# Patient Record
Sex: FEMALE | Race: WHITE | NOT HISPANIC OR LATINO | Employment: FULL TIME | URBAN - METROPOLITAN AREA
[De-identification: names, ages, dates, MRNs, and addresses within clinical notes are randomized per-mention and may not be internally consistent; named-entity substitution may affect disease eponyms.]

---

## 2016-09-07 LAB — PAP SMEAR - HIM PATIENT REPORTED: NORMAL

## 2019-05-22 ENCOUNTER — OFFICE VISIT - HEALTHEAST (OUTPATIENT)
Dept: FAMILY MEDICINE | Facility: CLINIC | Age: 24
End: 2019-05-22

## 2019-05-22 ENCOUNTER — COMMUNICATION - HEALTHEAST (OUTPATIENT)
Dept: TELEHEALTH | Facility: CLINIC | Age: 24
End: 2019-05-22

## 2019-05-22 DIAGNOSIS — Z00.00 ROUTINE GENERAL MEDICAL EXAMINATION AT A HEALTH CARE FACILITY: ICD-10-CM

## 2019-05-22 DIAGNOSIS — Z97.5 IUD (INTRAUTERINE DEVICE) IN PLACE: ICD-10-CM

## 2019-05-22 DIAGNOSIS — Z12.4 SCREENING FOR CERVICAL CANCER: ICD-10-CM

## 2019-05-22 LAB
CHOLEST SERPL-MCNC: 174 MG/DL
FASTING STATUS PATIENT QL REPORTED: YES
FASTING STATUS PATIENT QL REPORTED: YES
GLUCOSE BLD-MCNC: 93 MG/DL (ref 70–99)
HDLC SERPL-MCNC: 61 MG/DL
HGB BLD-MCNC: 14.9 G/DL (ref 12–16)
LDLC SERPL CALC-MCNC: 100 MG/DL
TRIGL SERPL-MCNC: 65 MG/DL

## 2019-05-22 ASSESSMENT — MIFFLIN-ST. JEOR: SCORE: 1271.12

## 2019-05-23 LAB
HPV SOURCE: NORMAL
HUMAN PAPILLOMA VIRUS 16 DNA: NEGATIVE
HUMAN PAPILLOMA VIRUS 18 DNA: NEGATIVE
HUMAN PAPILLOMA VIRUS FINAL DIAGNOSIS: NORMAL
HUMAN PAPILLOMA VIRUS OTHER HR: NEGATIVE
SPECIMEN DESCRIPTION: NORMAL

## 2019-05-24 ENCOUNTER — APPOINTMENT (OUTPATIENT)
Age: 24
Setting detail: DERMATOLOGY
End: 2019-05-27

## 2019-05-24 VITALS — RESPIRATION RATE: 16 BRPM | HEIGHT: 64 IN | WEIGHT: 118 LBS

## 2019-05-24 DIAGNOSIS — Q826 OTHER SPECIFIED ANOMALIES OF SKIN: ICD-10-CM

## 2019-05-24 DIAGNOSIS — Q828 OTHER SPECIFIED ANOMALIES OF SKIN: ICD-10-CM

## 2019-05-24 DIAGNOSIS — Q819 OTHER SPECIFIED ANOMALIES OF SKIN: ICD-10-CM

## 2019-05-24 DIAGNOSIS — D22 MELANOCYTIC NEVI: ICD-10-CM

## 2019-05-24 PROBLEM — D22.61 MELANOCYTIC NEVI OF RIGHT UPPER LIMB, INCLUDING SHOULDER: Status: ACTIVE | Noted: 2019-05-24

## 2019-05-24 PROBLEM — D22.62 MELANOCYTIC NEVI OF LEFT UPPER LIMB, INCLUDING SHOULDER: Status: ACTIVE | Noted: 2019-05-24

## 2019-05-24 PROBLEM — Q82.8 OTHER SPECIFIED CONGENITAL MALFORMATIONS OF SKIN: Status: ACTIVE | Noted: 2019-05-24

## 2019-05-24 PROBLEM — D22.5 MELANOCYTIC NEVI OF TRUNK: Status: ACTIVE | Noted: 2019-05-24

## 2019-05-24 PROBLEM — D22.71 MELANOCYTIC NEVI OF RIGHT LOWER LIMB, INCLUDING HIP: Status: ACTIVE | Noted: 2019-05-24

## 2019-05-24 PROBLEM — D22.72 MELANOCYTIC NEVI OF LEFT LOWER LIMB, INCLUDING HIP: Status: ACTIVE | Noted: 2019-05-24

## 2019-05-24 PROCEDURE — OTHER COUNSELING: OTHER

## 2019-05-24 PROCEDURE — 99203 OFFICE O/P NEW LOW 30 MIN: CPT

## 2019-05-24 PROCEDURE — OTHER PRESCRIPTION: OTHER

## 2019-05-24 RX ORDER — CLOBETASOL PROPIONATE 0.5 MG/G
A THIN LAYER CREAM TOPICAL BID
Qty: 1 | Refills: 0 | Status: ERX | COMMUNITY
Start: 2019-05-24

## 2019-05-24 ASSESSMENT — LOCATION DETAILED DESCRIPTION DERM
LOCATION DETAILED: RIGHT POPLITEAL SKIN
LOCATION DETAILED: RIGHT DISTAL POSTERIOR UPPER ARM
LOCATION DETAILED: LEFT PROXIMAL DORSAL FOREARM
LOCATION DETAILED: RIGHT ANTERIOR PROXIMAL UPPER ARM
LOCATION DETAILED: LEFT MEDIAL PLANTAR HEEL
LOCATION DETAILED: LEFT ANTERIOR PROXIMAL UPPER ARM
LOCATION DETAILED: RIGHT ANTERIOR PROXIMAL THIGH
LOCATION DETAILED: RIGHT MID-UPPER BACK
LOCATION DETAILED: LEFT ANTERIOR DISTAL THIGH
LOCATION DETAILED: LEFT PROXIMAL POSTERIOR UPPER ARM
LOCATION DETAILED: LEFT DISTAL POSTERIOR THIGH
LOCATION DETAILED: LEFT RIB CAGE

## 2019-05-24 ASSESSMENT — LOCATION SIMPLE DESCRIPTION DERM
LOCATION SIMPLE: LEFT FOREARM
LOCATION SIMPLE: RIGHT POSTERIOR UPPER ARM
LOCATION SIMPLE: RIGHT POPLITEAL SKIN
LOCATION SIMPLE: RIGHT THIGH
LOCATION SIMPLE: LEFT UPPER ARM
LOCATION SIMPLE: LEFT PLANTAR SURFACE
LOCATION SIMPLE: LEFT POSTERIOR UPPER ARM
LOCATION SIMPLE: RIGHT UPPER BACK
LOCATION SIMPLE: RIGHT UPPER ARM
LOCATION SIMPLE: LEFT THIGH
LOCATION SIMPLE: LEFT POSTERIOR THIGH
LOCATION SIMPLE: ABDOMEN

## 2019-05-24 ASSESSMENT — LOCATION ZONE DERM
LOCATION ZONE: LEG
LOCATION ZONE: TRUNK
LOCATION ZONE: ARM
LOCATION ZONE: FEET

## 2019-05-29 ENCOUNTER — RECORDS - HEALTHEAST (OUTPATIENT)
Dept: HEALTH INFORMATION MANAGEMENT | Facility: CLINIC | Age: 24
End: 2019-05-29

## 2019-05-30 LAB
BKR LAB AP ABNORMAL BLEEDING: NO
BKR LAB AP BIRTH CONTROL/HORMONES: NORMAL
BKR LAB AP CERVICAL APPEARANCE: NORMAL
BKR LAB AP GYN ADEQUACY: NORMAL
BKR LAB AP GYN INTERPRETATION: NORMAL
BKR LAB AP HPV REFLEX: NORMAL
BKR LAB AP LMP: NORMAL
BKR LAB AP PATIENT STATUS: NORMAL
BKR LAB AP PREVIOUS ABNORMAL: NORMAL
BKR LAB AP PREVIOUS NORMAL: 2016
HIGH RISK?: NO
PATH REPORT.COMMENTS IMP SPEC: NORMAL
RESULT FLAG (HE HISTORICAL CONVERSION): NORMAL

## 2019-06-09 ENCOUNTER — COMMUNICATION - HEALTHEAST (OUTPATIENT)
Dept: FAMILY MEDICINE | Facility: CLINIC | Age: 24
End: 2019-06-09

## 2019-08-20 ENCOUNTER — APPOINTMENT (OUTPATIENT)
Age: 24
Setting detail: DERMATOLOGY
End: 2019-08-20

## 2019-08-20 VITALS — RESPIRATION RATE: 16 BRPM | HEIGHT: 65 IN | WEIGHT: 117 LBS

## 2019-08-20 DIAGNOSIS — D22 MELANOCYTIC NEVI: ICD-10-CM

## 2019-08-20 PROBLEM — D48.5 NEOPLASM OF UNCERTAIN BEHAVIOR OF SKIN: Status: ACTIVE | Noted: 2019-08-20

## 2019-08-20 PROCEDURE — 11102 TANGNTL BX SKIN SINGLE LES: CPT

## 2019-08-20 PROCEDURE — 11103 TANGNTL BX SKIN EA SEP/ADDL: CPT

## 2019-08-20 PROCEDURE — OTHER COUNSELING: OTHER

## 2019-08-20 PROCEDURE — OTHER BIOPSY BY SHAVE METHOD: OTHER

## 2019-08-20 ASSESSMENT — LOCATION SIMPLE DESCRIPTION DERM
LOCATION SIMPLE: LEFT PLANTAR SURFACE
LOCATION SIMPLE: LEFT BREAST

## 2019-08-20 ASSESSMENT — LOCATION ZONE DERM
LOCATION ZONE: TRUNK
LOCATION ZONE: FEET

## 2019-08-20 ASSESSMENT — LOCATION DETAILED DESCRIPTION DERM
LOCATION DETAILED: LEFT MEDIAL PLANTAR HEEL
LOCATION DETAILED: LEFT MEDIAL BREAST 6-7:00 REGION

## 2019-08-20 NOTE — PROCEDURE: BIOPSY BY SHAVE METHOD
Additional Anesthesia Volume In Cc (Will Not Render If 0): 0
Silver Nitrate Text: The wound bed was treated with silver nitrate after the biopsy was performed.
Consent: Written consent was obtained and risks were reviewed including but not limited to scarring, infection, bleeding, scabbing, incomplete removal, nerve damage and allergy to anesthesia.
Destruction After The Procedure: No
Hemostasis: Drysol
Biopsy Type: H and E
Electrodesiccation Text: The wound bed was treated with electrodesiccation after the biopsy was performed.
Wound Care: Petrolatum
Curettage Text: The wound bed was treated with curettage after the biopsy was performed.
Detail Level: Detailed
Electrodesiccation And Curettage Text: The wound bed was treated with electrodesiccation and curettage after the biopsy was performed.
Billing Type: Client Bill
Notification Instructions: Patient will be notified of biopsy results. However, patient instructed to call the office if not contacted within 2 weeks.
Type Of Destruction Used: Curettage
Dressing: bandage
Anesthesia Type: 1% lidocaine with epinephrine
Was A Bandage Applied: Yes
Cryotherapy Text: The wound bed was treated with cryotherapy after the biopsy was performed.
Depth Of Biopsy: dermis
Biopsy Method: Dermablade
Anesthesia Volume In Cc (Will Not Render If 0): 0.5
Post-Care Instructions: I reviewed with the patient in detail post-care instructions. Patient is to keep the biopsy site dry overnight, and then apply bacitracin twice daily until healed. Patient may apply hydrogen peroxide soaks to remove any crusting.

## 2019-08-20 NOTE — HPI: EVALUATION OF SKIN LESION(S)
How Severe Are Your Spot(S)?: mild
Hpi Title: Evaluation of Skin Lesions
Additional History: Here to recheck the two moles

## 2019-08-20 NOTE — PROCEDURE: COUNSELING
Detail Level: Simple
Patient Specific Counseling (Will Not Stick From Patient To Patient): Pt decided to have both moles removed today.

## 2019-09-10 ENCOUNTER — COMMUNICATION - HEALTHEAST (OUTPATIENT)
Dept: FAMILY MEDICINE | Facility: CLINIC | Age: 24
End: 2019-09-10

## 2019-09-25 ENCOUNTER — APPOINTMENT (OUTPATIENT)
Age: 24
Setting detail: DERMATOLOGY
End: 2019-09-25

## 2019-09-25 VITALS — RESPIRATION RATE: 16 BRPM | HEIGHT: 67 IN | WEIGHT: 117 LBS

## 2019-09-25 DIAGNOSIS — D485 NEOPLASM OF UNCERTAIN BEHAVIOR OF SKIN: ICD-10-CM

## 2019-09-25 PROBLEM — D48.5 NEOPLASM OF UNCERTAIN BEHAVIOR OF SKIN: Status: ACTIVE | Noted: 2019-09-25

## 2019-09-25 PROCEDURE — OTHER COUNSELING: OTHER

## 2019-09-25 PROCEDURE — OTHER BIOPSY BY SHAVE METHOD: OTHER

## 2019-09-25 PROCEDURE — OTHER PATHOLOGY BILLING: OTHER

## 2019-09-25 PROCEDURE — 88305 TISSUE EXAM BY PATHOLOGIST: CPT

## 2019-09-25 PROCEDURE — 11102 TANGNTL BX SKIN SINGLE LES: CPT

## 2019-09-25 ASSESSMENT — LOCATION SIMPLE DESCRIPTION DERM: LOCATION SIMPLE: LEFT PLANTAR SURFACE

## 2019-09-25 ASSESSMENT — LOCATION ZONE DERM: LOCATION ZONE: FEET

## 2019-09-25 ASSESSMENT — LOCATION DETAILED DESCRIPTION DERM: LOCATION DETAILED: LEFT MEDIAL PLANTAR HEEL

## 2019-09-25 NOTE — PROCEDURE: COUNSELING
Patient Specific Counseling (Will Not Stick From Patient To Patient): Site p outlying biopsy and per pathology results, recommend deeper shave. Pt is here today for another shave biopsy and reviewed pathology results in great details.
Detail Level: Detailed

## 2019-09-25 NOTE — PROCEDURE: PATHOLOGY BILLING
Immunohistochemistry ([id number] and [id number]) billing is not performed here. Please use the Immunohistochemistry Stain Billing plan to accomplish this. Immunohistochemistry ([id number] and [id number]) billing is not performed here. Please use the Immunohistochemistry Stain Billing plan to accomplish this.

## 2019-09-25 NOTE — PROCEDURE: BIOPSY BY SHAVE METHOD
Post-Care Instructions: I reviewed with the patient in detail post-care instructions. Patient is to keep the biopsy site dry overnight, and then apply vaseline or Aquaphor with a new bandaid daily until healed.

## 2019-09-25 NOTE — HPI: SKIN LESION
Additional History: Further biopsy required because an inadequate sample was removed the first time.

## 2019-09-25 NOTE — PROCEDURE: BIOPSY BY SHAVE METHOD
Path Notes (To The Dermatopathologist): Previous bx site [id number] Path Notes (To The Dermatopathologist): Previous bx site [id number]

## 2021-05-29 NOTE — PROGRESS NOTES
"FEMALE PREVENTATIVE EXAM    Assessment and Plan:     1. Routine general medical examination at a health care facility  - Contraception: Mirena IUD, remove 9/2021  - Hemoglobin  - Glucose  - Lipid Cascade given her famhx and personal hx of mildly elevated cholesterol despite BMI 19 and active lifestyle with healthy eating patterns    2. Screening for cervical cancer  Reviewed her normal Pap result from 9/2016.  We will update this today as she is establishing care.  - Gynecologic Cytology (PAP Smear)     Next follow up:  Return in about 1 year (around 5/22/2020) for Annual physical.    Immunization Review  Adult Imm Review: No immunizations due today    I discussed the following with the patient:   Adult Healthy Living: Importance of regular exercise  Healthy nutrition  Getting adequate sleep  Stress management  Use of seat belts  Distracted driving  STI prevention  Contraception options  Supplement use      Subjective:   Chief Complaint: Meghann Burt is an 23 y.o. female here for a preventative health visit.     HPI:    Chief Complaint   Patient presents with     Annual Exam      new pt      Cholesterol levels run high in the maternal side of the family. Would like to be retested. Has been borderline in the past.     Last pap done 9/1/16 and normal. She shows me the formal report of this. Would like updated today to stay on schedule.     Mirena IUD- placed 9/1/16. Due to remove in 2021 and that's when she is thinking about trying to start having children. Occasional spotting but no periods with this. \"I love my IUD\".     Has a mole on her breast, changed recently. Has a dermatology appt scheduled for this already.    Wondering if left ear is draining. No recent colds.     Social History     Social History Narrative    . From [place], [place] originally. She is a  with [employer].  works with [employer]. Social alcohol 1x/every few weeks. Never smoker. Loves biking, golf, and aerobic dance videos, " "like Elsa. Exercises 3x/week.     Anya Palmer MD       Healthy Habits  Are you taking a daily aspirin? No  Do you typically exercising at least 40 min, 3-4 times per week?  NO  Do you usually eat at least 4 servings of fruit and vegetables a day, include whole grains and fiber and avoid regularly eating high fat foods? Yes  Have you had an eye exam in the past two years? Yes  Do you see a dentist twice per year? Yes  Do you have any concerns regarding sleep? No    Safety Screen  If you own firearms, are they secured in a locked gun cabinet or with trigger locks? NO  Do you feel you are safe where you are living?: Yes (5/22/2019  8:36 AM)  Do you feel you are safe in your relationship(s)?: Yes (5/22/2019  8:36 AM)      Review of Systems:  Please see above.  The rest of the review of systems are negative for all systems.     Pap History:   No - age 21-29 PAP every 3 years recommended  Cancer Screening       Status Date      PAP SMEAR Overdue 7/18/2014         Patient Care Team:  Anya Palmer MD as PCP - General (Family Medicine)        History     Reviewed By Date/Time Sections Reviewed    Anya Palmer MD 5/22/2019  9:22 AM Medical, Surgical, Tobacco, Family    [facility] 5/22/2019  9:16 AM Tobacco, Alcohol, Drug Use, Sexual Activity    Anya Palmer MD 5/22/2019  9:04 AM Social Documentation    [facility] 5/22/2019  8:36 AM Tobacco, Alcohol, Drug Use, Sexual Activity    [facility] 5/22/2019  8:35 AM Tobacco, Alcohol, Drug Use, Sexual Activity            Objective:   Vital Signs:   Visit Vitals  /76 (Patient Site: Left Arm, Patient Position: Sitting, Cuff Size: Adult Regular)   Pulse 76   Temp 97.8  F (36.6  C)   Ht 5' 5\" (1.651 m)   Wt 118 lb (53.5 kg)   LMP  (Approximate) Comment: Mirena Placed 09/01/2016   Breastfeeding? No   BMI 19.64 kg/m           PHYSICAL EXAM  Constitutional: Patient is oriented to person, place, and time. Patient appears " well-developed and well-nourished. No distress.   Head: Normocephalic and atraumatic.   Ears: External ear and TMs normal bilaterally.  Nose: Nose normal.   Mouth/Throat: Oropharynx is clear and moist. No oropharyngeal exudate.   Eyes: Conjunctivae and EOM are normal. Pupils are equal, round, and reactive to light. No discharge. No scleral icterus.   Neck: Neck supple. No JVD present. No tracheal deviation present. No thyromegaly present.  Breasts: Normal appearing, no skin changes, no palpable mass, no tenderness on palpation.  No axillary involvement  Cardiovascular: Normal rate, regular rhythm, normal heart sounds and intact distal pulses. No murmur heard.   Pulmonary/Chest: Effort normal and breath sounds normal. Patient has no wheezes, no rales, exhibits no tenderness.   Abdominal: Soft. Bowel sounds are normal. No masses. There is no tenderness.   Lymphadenopathy:  Patient has no cervical adenopathy.   Neurological: Patient is alert and oriented to person, place, and time. Patient has normal reflexes. No cranial nerve deficit. Coordination normal.   Skin: Skin is warm and dry. No rash noted. No pallor.   Pelvic: Normal external genitalia with Normal vulva.  Normal vagina with no polyps or lesions and with physiologic discharge.  Normal cervix with normal mucosa and without CMT.  No adnexal masses  Psychiatric: Patient has good eye contact without any psychomotor retardation or stereotypic behaviors.  normal mood and affect. Judgment and thought content normal.   Speech is regular rate and rhythm.        Medication List      as of 5/22/2019  9:24 AM     You have not been prescribed any medications.         Additional Screenings Completed Today:   PHQ9 score PHQ-9 Total Score: 0 (5/22/2019  8:00 AM)    Little interest or pleasure in doing things: Not at all  Feeling down, depressed, or hopeless: Not at all  Trouble falling or staying asleep, or sleeping too much: Not at all  Feeling tired or having little  energy: Not at all  Poor appetite or overeating: Not at all  Feeling bad about yourself - or that you are a failure or have let yourself or your family down: Not at all  Trouble concentrating on things, such as reading the newspaper or watching television: Not at all  Moving or speaking so slowly that other people could have noticed. Or the opposite - being so fidgety or restless that you have been moving around a lot more than usual: Not at all  Thoughts that you would be better off dead, or of hurting yourself in some way: Not at all  PHQ-9 Total Score: 0  If you checked off any problems, how difficult have these problems made it for you to do your work, take care of things at home, or get along with other people?: Not difficult at all    GAD7 score KAIDEN 7 Total Score: 1 (2019  8:00 AM)    How difficult did these problems make it for you to do your work, take care of things at home or get along with other people? : Not difficult at all (2019  8:00 AM)  Feeling nervous, anxious, or on edge: 0 (2019  8:00 AM)  Not being able to stop or control worryin (2019  8:00 AM)  Worrying too much about different things: 1 (2019  8:00 AM)  Trouble relaxin (2019  8:00 AM)  Being so restless that it's hard to sit still: 0 (2019  8:00 AM)  Becoming easily annoyed or irritable: 0 (2019  8:00 AM)  Feeling afraid as if something awful might happen: 0 (2019  8:00 AM)  KAIDEN 7 Total Score: 1 (2019  8:00 AM)  How difficult did these problems make it for you to do your work, take care of things at home or get along with other people? : Not difficult at all (2019  8:00 AM)

## 2021-05-29 NOTE — PROGRESS NOTES
Your pap and HPV DNA cotesting results are normal. We recommend that this test should be repeated in 5 years. If you have any questions please call our clinic.

## 2021-06-01 NOTE — TELEPHONE ENCOUNTER
Offer patient appointment here in clinic today. She has a busy scheduled at work is unable to come in to clinic. Offered her to go to walk in clinic either at our [place] location or [place] location. She will see how she feels later this afternoon and will decide.

## 2021-06-02 VITALS — WEIGHT: 118 LBS | BODY MASS INDEX: 19.66 KG/M2 | HEIGHT: 65 IN

## 2021-06-16 PROBLEM — Z97.5 IUD (INTRAUTERINE DEVICE) IN PLACE: Status: ACTIVE | Noted: 2019-05-22

## 2021-06-26 ENCOUNTER — HEALTH MAINTENANCE LETTER (OUTPATIENT)
Age: 26
End: 2021-06-26

## 2021-10-16 ENCOUNTER — HEALTH MAINTENANCE LETTER (OUTPATIENT)
Age: 26
End: 2021-10-16

## 2022-07-17 ENCOUNTER — HEALTH MAINTENANCE LETTER (OUTPATIENT)
Age: 27
End: 2022-07-17

## 2022-09-25 ENCOUNTER — HEALTH MAINTENANCE LETTER (OUTPATIENT)
Age: 27
End: 2022-09-25

## 2022-09-29 ENCOUNTER — TRANSFERRED RECORDS (OUTPATIENT)
Dept: HEALTH INFORMATION MANAGEMENT | Facility: CLINIC | Age: 27
End: 2022-09-29

## 2022-10-27 ENCOUNTER — TRANSFERRED RECORDS (OUTPATIENT)
Dept: HEALTH INFORMATION MANAGEMENT | Facility: CLINIC | Age: 27
End: 2022-10-27

## 2022-10-27 LAB — TSH SERPL-ACNC: 3.34 UIU/ML (ref 0.45–4.5)

## 2022-11-10 ENCOUNTER — TRANSFERRED RECORDS (OUTPATIENT)
Dept: HEALTH INFORMATION MANAGEMENT | Facility: CLINIC | Age: 27
End: 2022-11-10

## 2022-11-10 LAB — PAP-ABSTRACT: NORMAL

## 2023-03-21 ENCOUNTER — MEDICAL CORRESPONDENCE (OUTPATIENT)
Dept: HEALTH INFORMATION MANAGEMENT | Facility: CLINIC | Age: 28
End: 2023-03-21

## 2023-06-09 ENCOUNTER — OFFICE VISIT (OUTPATIENT)
Dept: FAMILY MEDICINE | Facility: CLINIC | Age: 28
End: 2023-06-09
Payer: COMMERCIAL

## 2023-06-09 VITALS
TEMPERATURE: 98.2 F | HEART RATE: 76 BPM | WEIGHT: 116.13 LBS | OXYGEN SATURATION: 100 % | RESPIRATION RATE: 16 BRPM | HEIGHT: 65 IN | BODY MASS INDEX: 19.35 KG/M2 | SYSTOLIC BLOOD PRESSURE: 111 MMHG | DIASTOLIC BLOOD PRESSURE: 74 MMHG

## 2023-06-09 DIAGNOSIS — R07.89 CHEST WALL PAIN: ICD-10-CM

## 2023-06-09 DIAGNOSIS — S29.011A INTERCOSTAL MUSCLE STRAIN, INITIAL ENCOUNTER: Primary | ICD-10-CM

## 2023-06-09 PROCEDURE — 99203 OFFICE O/P NEW LOW 30 MIN: CPT

## 2023-06-09 RX ORDER — PRENATAL VIT NO.126/IRON/FOLIC 28MG-0.8MG
1 TABLET ORAL DAILY
COMMUNITY
End: 2024-02-13

## 2023-06-09 NOTE — PROGRESS NOTES
Assessment & Plan   Problem List Items Addressed This Visit        Nervous and Auditory    Chest wall pain     Discussed potential etiologies today including intercostal muscle strain versus mastitis.  The pain is located inferior to her breast, and she has no overlying erythema or swelling.  Her milk production is normal.  Discussed that I am less suspicious of a mastitis at this point.  With her report of heavy lifting and repetitive movements, most likely diagnosis is an intercostal muscle strain.  Pain is relatively mild with no alarm features.  We discussed supportive cares, including the application of heat versus ice and over-the-counter anti-inflammatories.  The pain that she associates with the need to nurse/pumpe could be related to a milk duct dysfunction, especially as she was recently traveling and had difficulty maintaining her pump schedule, however we discussed that the treatment of such in the absence of systemic symptoms would be very similar to the treatment of intercostal muscle strain.  We discussed reasons to return to care, including the persistence of pain over the next 1 to 2 weeks or worsening of pain.  Additionally, if she develops additional symptoms, recommend she be seen.  Patient expressed understanding of and comfort with this plan.  All questions were answered.        Other Visit Diagnoses     Intercostal muscle strain, initial encounter    -  Primary         IFRAH Gardner CNP  [facility]    Gordon Cain is a 27 year old, presenting for the following health issues:  Breast Pain (RT side to breast. Currently breast feeding.)        6/9/2023     9:10 AM   Additional Questions   Roomed by sac   Accompanied by self         6/9/2023     9:10 AM   Patient Reported Additional Medications   Patient reports taking the following new medications no     Symptoms have been present since Monday.  Not necessarily worsening since then, but not  "improving.  Pain is a dull ache located on her right chest wall, underneath her right breast.  Aggravating factors include breast fullness associated with the need to endure/pump.  She is currently nursing.  She feels as if when her breast feels, the pain becomes more sharp.  Has tried 1 dose of ibuprofen on Tuesday, which did help with the pain.  Denies any lumps or bumps to the right breast.  No skin changes.  Average milk expression is normal.  No systemic symptoms such as fever or chills.  In terms of preceding events, she does report that she was traveling over the weekend and had a heavy suitcase that she was dragging through the airport.  Additionally, she was in the car with her daughter and reports that she was consistently reaching back and over to her left side.    History of Present Illness       Reason for visit:  Rib and breast pain on right side.  Symptom onset:  3-7 days ago    She eats 4 or more servings of fruits and vegetables daily.She consumes 0 sweetened beverage(s) daily.She exercises with enough effort to increase her heart rate 10 to 19 minutes per day.  She exercises with enough effort to increase her heart rate 3 or less days per week.   She is taking medications regularly.     Review of Systems         Objective    /74 (BP Location: Left arm, Patient Position: Sitting, Cuff Size: Adult Regular)   Pulse 76   Temp 98.2  F (36.8  C) (Oral)   Resp 16   Ht 1.651 m (5' 5\")   Wt 52.7 kg (116 lb 2 oz)   LMP 12/01/2021 (Approximate)   SpO2 100%   BMI 19.32 kg/m    Body mass index is 19.32 kg/m .  Physical Exam  Vitals and nursing note reviewed.   Constitutional:       General: She is not in acute distress.     Appearance: Normal appearance.   Chest:      Chest wall: Tenderness (mild tenderness to intercostal muscle on the right) present. No mass, crepitus or edema.   Breasts:     Right: Normal. No mass, nipple discharge, skin change (no overlying erythema) or tenderness.      Left: " Normal.       Neurological:      Mental Status: She is alert.

## 2023-06-09 NOTE — ASSESSMENT & PLAN NOTE
Discussed potential etiologies today including intercostal muscle strain versus mastitis.  The pain is located inferior to her breast, and she has no overlying erythema or swelling.  Her milk production is normal.  Discussed that I am less suspicious of a mastitis at this point.  With her report of heavy lifting and repetitive movements, most likely diagnosis is an intercostal muscle strain.  Pain is relatively mild with no alarm features.  We discussed supportive cares, including the application of heat versus ice and over-the-counter anti-inflammatories.  The pain that she associates with the need to nurse/pumpe could be related to a milk duct dysfunction, especially as she was recently traveling and had difficulty maintaining her pump schedule, however we discussed that the treatment of such in the absence of systemic symptoms would be very similar to the treatment of intercostal muscle strain.  We discussed reasons to return to care, including the persistence of pain over the next 1 to 2 weeks or worsening of pain.  Additionally, if she develops additional symptoms, recommend she be seen.  Patient expressed understanding of and comfort with this plan.  All questions were answered.

## 2023-06-26 ASSESSMENT — ENCOUNTER SYMPTOMS
ABDOMINAL PAIN: 0
MYALGIAS: 0
CONSTIPATION: 0
HEADACHES: 0
JOINT SWELLING: 0
NAUSEA: 0
FREQUENCY: 0
SHORTNESS OF BREATH: 0
HEMATURIA: 0
ARTHRALGIAS: 0
NERVOUS/ANXIOUS: 0
CHILLS: 0
HEARTBURN: 0
FEVER: 0
COUGH: 1
WEAKNESS: 0
EYE PAIN: 0
DYSURIA: 0
HEMATOCHEZIA: 0
PALPITATIONS: 0
DIZZINESS: 0
DIARRHEA: 0
BREAST MASS: 0
PARESTHESIAS: 0
SORE THROAT: 0

## 2023-06-28 ENCOUNTER — OFFICE VISIT (OUTPATIENT)
Dept: FAMILY MEDICINE | Facility: CLINIC | Age: 28
End: 2023-06-28
Payer: COMMERCIAL

## 2023-06-28 VITALS
HEART RATE: 65 BPM | HEIGHT: 65 IN | WEIGHT: 113.5 LBS | SYSTOLIC BLOOD PRESSURE: 98 MMHG | OXYGEN SATURATION: 99 % | BODY MASS INDEX: 18.91 KG/M2 | DIASTOLIC BLOOD PRESSURE: 66 MMHG

## 2023-06-28 DIAGNOSIS — Z11.59 NEED FOR HEPATITIS C SCREENING TEST: ICD-10-CM

## 2023-06-28 DIAGNOSIS — Z83.438 FAMILY HISTORY OF HYPERLIPIDEMIA: ICD-10-CM

## 2023-06-28 DIAGNOSIS — Z11.4 SCREENING FOR HIV (HUMAN IMMUNODEFICIENCY VIRUS): ICD-10-CM

## 2023-06-28 DIAGNOSIS — Z82.49 FAMILY HISTORY OF CORONARY ARTERY DISEASE IN GRANDFATHER: ICD-10-CM

## 2023-06-28 DIAGNOSIS — Z00.00 ROUTINE GENERAL MEDICAL EXAMINATION AT A HEALTH CARE FACILITY: Primary | ICD-10-CM

## 2023-06-28 LAB
CHOLEST SERPL-MCNC: 166 MG/DL
FASTING STATUS PATIENT QL REPORTED: NORMAL
GLUCOSE SERPL-MCNC: 88 MG/DL (ref 70–99)
HDLC SERPL-MCNC: 57 MG/DL
HGB BLD-MCNC: 14.3 G/DL (ref 11.7–15.7)
LDLC SERPL CALC-MCNC: 101 MG/DL
NONHDLC SERPL-MCNC: 109 MG/DL
TRIGL SERPL-MCNC: 42 MG/DL
TSH SERPL DL<=0.005 MIU/L-ACNC: 2.07 UIU/ML (ref 0.3–4.2)

## 2023-06-28 PROCEDURE — 85018 HEMOGLOBIN: CPT | Performed by: FAMILY MEDICINE

## 2023-06-28 PROCEDURE — 87389 HIV-1 AG W/HIV-1&-2 AB AG IA: CPT | Performed by: FAMILY MEDICINE

## 2023-06-28 PROCEDURE — 84443 ASSAY THYROID STIM HORMONE: CPT | Performed by: FAMILY MEDICINE

## 2023-06-28 PROCEDURE — 99395 PREV VISIT EST AGE 18-39: CPT | Performed by: FAMILY MEDICINE

## 2023-06-28 PROCEDURE — 82947 ASSAY GLUCOSE BLOOD QUANT: CPT | Performed by: FAMILY MEDICINE

## 2023-06-28 PROCEDURE — 36415 COLL VENOUS BLD VENIPUNCTURE: CPT | Performed by: FAMILY MEDICINE

## 2023-06-28 PROCEDURE — 80061 LIPID PANEL: CPT | Performed by: FAMILY MEDICINE

## 2023-06-28 PROCEDURE — 86803 HEPATITIS C AB TEST: CPT | Performed by: FAMILY MEDICINE

## 2023-06-28 ASSESSMENT — ENCOUNTER SYMPTOMS
DIARRHEA: 0
ARTHRALGIAS: 0
SHORTNESS OF BREATH: 0
HEARTBURN: 0
ABDOMINAL PAIN: 0
SORE THROAT: 0
DIZZINESS: 0
FEVER: 0
EYE PAIN: 0
BREAST MASS: 0
HEMATURIA: 0
HEMATOCHEZIA: 0
NAUSEA: 0
COUGH: 1
PARESTHESIAS: 0
HEADACHES: 0
CONSTIPATION: 0
JOINT SWELLING: 0
PALPITATIONS: 0
WEAKNESS: 0
MYALGIAS: 0
CHILLS: 0
DYSURIA: 0
FREQUENCY: 0
NERVOUS/ANXIOUS: 0

## 2023-06-28 NOTE — PROGRESS NOTES
"   SUBJECTIVE:   CC: Payton is an 27 year old who presents for preventive health visit.       2023     8:50 AM   Additional Questions   Roomed by Devi ROBLERO LPN     Healthy Habits:     Getting at least 3 servings of Calcium per day:  Yes    Bi-annual eye exam:  Yes    Dental care twice a year:  Yes    Sleep apnea or symptoms of sleep apnea:  None    Diet:  Regular (no restrictions)    Frequency of exercise:  2-3 days/week    Duration of exercise:  15-30 minutes    Taking medications regularly:  Yes    Medication side effects:  Not applicable    PHQ-2 Total Score: 0    Additional concerns today:  No      Chief Complaint   Patient presents with     Physical     Fasting     She moved to [place] for 4 years for her 's job. Now moved back to be with family here in [place].     Family hx of HLD; was started on a statin in [place] due to higher LDL (133) even though she was working out 5x/week and eating well. Her LDL in  here was 100. She stopped the statin for her pregnancy.     She was on a thyroid medication (levothyroxine low dose) during her pregnancy due to abnormal labs pre pregnancy but normalized after she stopped it after her pregnancy.     Has a 9 month old daughter Annalise; was measuring small but she was 8lb 7oz at 41 weeks (water broke at the clinic); labored for 26 hours; Triple I and needed to end in  due to fetal heart rate dropping while pushing for 3 hrs. 8-9cm for a very long time. Her  team said her baby \"just didn't fit\".   Recovery was ok.     Currently pumping 10min twice daily 10am and 3pm and working to wean from the pump; nursing other times.       BMI currently nearly 19. Highest pregnancy weight was 149lbs.     Pap was done in November; LUCRETIA signed. It was normal.    Mirena IUD was placed in 2023. The one in  was easy placement; however the  IUD post partum placement wasn't successful at 8 weeks pp and needed the ultrasound guided IUD placement which was very " uncomfortable.     Today's PHQ-2 Score:       6/28/2023     8:33 AM   PHQ-2 ( 1999 [employer])   Q1: Little interest or pleasure in doing things 0   Q2: Feeling down, depressed or hopeless 0   PHQ-2 Score 0   Q1: Little interest or pleasure in doing things Not at all   Q2: Feeling down, depressed or hopeless Not at all   PHQ-2 Score 0       Have you ever done Advance Care Planning? (For example, a Health Directive, POLST, or a discussion with a medical provider or your loved ones about your wishes): No, advance care planning information given to patient to review.  Patient plans to discuss their wishes with loved ones or provider.      Social History     Tobacco Use     Smoking status: Never     Smokeless tobacco: Never   Substance Use Topics     Alcohol use: Yes           6/26/2023     1:19 PM   Alcohol Use   Prescreen: >3 drinks/day or >7 drinks/week? No          No data to display              Reviewed orders with patient.  Reviewed health maintenance and updated orders accordingly - Yes    Breast Cancer Screening:    FHS-7:       6/26/2023     1:20 PM   Breast CA Risk Assessment (FHS-7)   Did any of your first-degree relatives have breast or ovarian cancer? No   Did any of your relatives have bilateral breast cancer? No   Did any man in your family have breast cancer? No   Did any woman in your family have breast and ovarian cancer? No   Did any woman in your family have breast cancer before age 50 y? Yes   Do you have 2 or more relatives with breast and/or ovarian cancer? No   Do you have 2 or more relatives with breast and/or bowel cancer? No       Pertinent mammograms are reviewed under the imaging tab.    History of abnormal Pap smear: NO - age 30- 65 PAP every 3 years recommended      Latest Ref Rng & Units 5/22/2019     9:34 AM   PAP / HPV   PAP Negative for squamous intraepithelial lesion or malignancy. Negative for squamous intraepithelial lesion or malignancy  Electronically signed by Sirisha Doyle  CT (ASCP) on 5/30/2019 at  2:41 PM      HPV 16 DNA NEG Negative    HPV 18 DNA NEG Negative    Other HR HPV NEG Negative      Reviewed and updated as needed this visit by clinical staff   Tobacco  Allergies  Meds              Reviewed and updated as needed this visit by Provider                   Review of Systems   Constitutional: Negative for chills and fever.   HENT: Positive for congestion. Negative for ear pain, hearing loss and sore throat.    Eyes: Negative for pain and visual disturbance.   Respiratory: Positive for cough. Negative for shortness of breath.    Cardiovascular: Negative for chest pain, palpitations and peripheral edema.   Gastrointestinal: Negative for abdominal pain, constipation, diarrhea, heartburn, hematochezia and nausea.   Breasts:  Negative for tenderness, breast mass and discharge.   Genitourinary: Positive for vaginal discharge. Negative for dysuria, frequency, genital sores, hematuria, pelvic pain, urgency and vaginal bleeding.   Musculoskeletal: Negative for arthralgias, joint swelling and myalgias.   Skin: Negative for rash.   Neurological: Negative for dizziness, weakness, headaches and paresthesias.   Psychiatric/Behavioral: Negative for mood changes. The patient is not nervous/anxious.        OBJECTIVE:   LMP 12/01/2021 (Approximate)   Breastfeeding Yes   Physical Exam  GENERAL: healthy, alert and no distress  EYES: Eyes grossly normal to inspection, PERRL and conjunctivae and sclerae normal  HENT: ear canals and TM's normal, nose and mouth without ulcers or lesions  NECK: no adenopathy, no asymmetry, masses, or scars and thyroid normal to palpation  RESP: lungs clear to auscultation - no rales, rhonchi or wheezes  BREAST: declines  CV: regular rate and rhythm, normal S1 S2, no S3 or S4, no murmur, click or rub, no peripheral edema and peripheral pulses strong  ABDOMEN: soft, nontender, no hepatosplenomegaly, no masses and bowel sounds normal  MS: no gross musculoskeletal  defects noted, no edema  SKIN: no suspicious lesions or rashes  NEURO: Normal strength and tone, mentation intact and speech normal  PSYCH: mentation appears normal, affect normal/bright    Diagnostic Test Results:  Labs reviewed in Epic    ASSESSMENT/PLAN:   Meghann was seen today for physical.    Diagnoses and all orders for this visit:    Routine general medical examination at a health care facility  Mirena IUD paced 2022 post partum with ultrasound guidance in [place]  Pregnancy care with me reviewed; she is not yet sure about TOLAC vs scheduled  and we talked at length today about that  -     REVIEW OF HEALTH MAINTENANCE PROTOCOL ORDERS  -     HIV Antigen Antibody Combo; Future  -     Hepatitis C Screen Reflex to HCV RNA Quant and Genotype; Future  -     Lipid panel reflex to direct LDL Fasting; Future  -     Glucose; Future  -     Hemoglobin; Future  -     TSH with free T4 reflex; Future- hx of hypothyroidism in pregnancy. Not currently on levothyroxine.     Screening for HIV (human immunodeficiency virus)  -     HIV Antigen Antibody Combo; Future    Need for hepatitis C screening test  -     Hepatitis C Screen Reflex to HCV RNA Quant and Genotype; Future    Family history of hyperlipidemia  Family history of coronary artery disease in grandfather  In [place] she was started on a statin for LDL of 133 given family history however we reviewed today in great detail and last LDL is greater than 190 or substantially higher I would tend to defer/hold off on a statin in childbearing age at this point.  She is actively managing her life with healthy lifestyle choices in terms of diet and exercise.  This is different than her family history General body habitus and lifestyle.  - checking lipids today      Patient has been advised of split billing requirements and indicates understanding: Yes      COUNSELING:  Reviewed preventive health counseling, as reflected in patient instructions        She reports that  she has never smoked. She has never used smokeless tobacco.      Anya Palmer MD  [facility]

## 2023-06-29 LAB
HCV AB SERPL QL IA: NONREACTIVE
HIV 1+2 AB+HIV1 P24 AG SERPL QL IA: NONREACTIVE

## 2023-08-10 ENCOUNTER — E-VISIT (OUTPATIENT)
Dept: FAMILY MEDICINE | Facility: CLINIC | Age: 28
End: 2023-08-10
Payer: COMMERCIAL

## 2023-08-10 ENCOUNTER — NURSE TRIAGE (OUTPATIENT)
Dept: FAMILY MEDICINE | Facility: CLINIC | Age: 28
End: 2023-08-10

## 2023-08-10 DIAGNOSIS — N61.0 MASTITIS: Primary | ICD-10-CM

## 2023-08-10 PROCEDURE — 99207 PR NON-BILLABLE SERV PER CHARTING: CPT | Performed by: FAMILY MEDICINE

## 2023-08-10 RX ORDER — DICLOXACILLIN SODIUM 500 MG
500 CAPSULE ORAL 4 TIMES DAILY
Qty: 28 CAPSULE | Refills: 0 | Status: SHIPPED | OUTPATIENT
Start: 2023-08-10 | End: 2023-08-17

## 2023-08-10 NOTE — TELEPHONE ENCOUNTER
"Nurse Triage SBAR    Is this a 2nd Level Triage? YES, LICENSED PRACTITIONER REVIEW IS REQUIRED    Situation:   Patient believes has mastitis    Background:   Assessment:  1. PAIN: Pain only with palpation, no concerns with pumping    2. SKIN:    Red, hot, feels swollen    3. LOCATION:    Left breast, top middle area, does not include nipple    4. ONSET:    Middle of this AM 9:30-10 AM    5. DELIVERY:    09/15/2022    6. BREASTFEEDING:   This AM     7. FEVER: \"Do you have a fever?\"   101.0    8. OTHER SYMPTOMS: None    Protocol Recommended Disposition:   See in Office Today    Recommendation:     Send in e-visit    Routed to provider    Does the patient meet one of the following criteria for ADS visit consideration? No             Reason for Disposition   Breast looks infected (area of redness, feels hot to touch) and no fever    Additional Information   Negative: Sounds like a life-threatening emergency to the triager    Protocols used: Postpartum - Breast Pain and Rqomxkbjlot-F-OD  Rhini, RN  [facility]"

## 2023-08-10 NOTE — PATIENT INSTRUCTIONS
"Thank you for choosing us for your care. I'm sorry you aren't feeling well Payton. I have placed an order for a prescription so that you can start treatment. With mastitis we can sometimes avoid the antibiotic use but I will send it to have on file in case things are not improving. Here is the summary of other ideas you can try for mastitis:   ------------------------------  Healthy lactating breasts sometimes feel lumpy, and there can be pain, fullness or redness after prolonged periods without removing milk.     Sometimes we can get an area of inflammation in the breast, with a lump, pain and sometimes redness.  This is often called a \"plugged duct,\" even though it is not actually a physical clogging of one milk duct.  It is comes from the milk ducts being narrowed by inflammation.      The main things to do are use some gentle heat on that area if it is comfortable for you, take ibuprofen or Tylenol if you need it for pain, and try some very gentle massage. The massage should be extremely light, like petting a cat, and go from the center of your breast outwards towards your armpit.  This helps the swelling and extra fluid move out of your breast.  You can also add a lecithin supplement, 1200 mg four times daily, to help with milk flow and reduce inflammation.  Continue to nurse your baby as you have been--you do not need to nurse more on that side, because increasing the milk supply will actually just worsen the problem.  If you are pumping, just pump the amount that your baby needs.       Occasionally if the inflammation increases, women can feel tired, achy or have a fever--this is called inflammatory mastitis, and it generally goes away within 24 hours as you do the things mentioned above.  If it doesn't get better, though, and you are feeling ill or have a fever for more than 24 hours, then you may have a bacterial infection that needs medication--so you should call your doctor or provider about a possible " prescription.         WHAT TO DO: Many mastitis symptoms will resolve with conservative care and social support     If breast is so swollen and inflamed that milk is not being released, stop attempting to further feed or express from that side.  Feed from the other side until inflammation decreases.  Milk supply may decrease, but that is partially the goal, and if it drops too far, it can be increased again later.     Application of ice     Ibuprofen 800 mg q8h and/or Tylenol 1000 mg q8h to reduce inflammation     Heat to breast if desired--can increase comfort although no evidence for effect on outcomes     Lecithin supplement, 5000 - 10,000 mg daily to emulsify milk and decrease ductal inflammation (this would be 1200 mg - 2400 mg qid)     Treat oversupply:  it predisposes to congestion and ductal inflammation, which leads to more ductal narrowing and inflammation.  May temporarily increase discomfort, fullness and redness, but ultimately leads to fewer episodes of inflammation     Consider use of breast-specific probiotics (evidence mixed):  should contain L. fermentum or L. salivarius     Minimize pumping other than that needed to provide for infant s needs          If you're not feeling better within 5-7 days, please schedule an appointment.  You can schedule an appointment right here in [facility], or call [phone]  If the visit is for the same symptoms as your eVisit, we'll refund the cost of your eVisit if seen within seven days.      Thanks,   Dr. Palmer

## 2023-10-24 ENCOUNTER — IMMUNIZATION (OUTPATIENT)
Dept: NURSING | Facility: CLINIC | Age: 28
End: 2023-10-24
Payer: COMMERCIAL

## 2023-10-24 PROCEDURE — 91320 SARSCV2 VAC 30MCG TRS-SUC IM: CPT

## 2023-10-24 PROCEDURE — 90686 IIV4 VACC NO PRSV 0.5 ML IM: CPT

## 2023-10-24 PROCEDURE — 90480 ADMN SARSCOV2 VAC 1/ONLY CMP: CPT

## 2023-10-24 PROCEDURE — 90471 IMMUNIZATION ADMIN: CPT

## 2024-01-29 ENCOUNTER — MYC MEDICAL ADVICE (OUTPATIENT)
Dept: FAMILY MEDICINE | Facility: CLINIC | Age: 29
End: 2024-01-29
Payer: COMMERCIAL

## 2024-01-29 NOTE — TELEPHONE ENCOUNTER
Ok to use prenatal spot on 2/8 or any other  sameday/nextday/acute care type holds in Feb per pt preference for this acute need.

## 2024-01-29 NOTE — TELEPHONE ENCOUNTER
Sending to PCP for review.  Please review and advise on patient MyChart message.    First open appointment is 2/8/2024 at 9:30 am for PRENATAL spot.  First SAME DAY is 2/13/2024.    Please advise of using above office visit or if patient should be seen by another provider.     Martín Snider RN  [facility]

## 2024-02-13 ENCOUNTER — OFFICE VISIT (OUTPATIENT)
Dept: FAMILY MEDICINE | Facility: CLINIC | Age: 29
End: 2024-02-13
Payer: COMMERCIAL

## 2024-02-13 VITALS
WEIGHT: 117 LBS | DIASTOLIC BLOOD PRESSURE: 62 MMHG | RESPIRATION RATE: 16 BRPM | OXYGEN SATURATION: 96 % | HEART RATE: 71 BPM | SYSTOLIC BLOOD PRESSURE: 100 MMHG | BODY MASS INDEX: 19.56 KG/M2 | TEMPERATURE: 97.5 F

## 2024-02-13 DIAGNOSIS — Z30.432 ENCOUNTER FOR IUD REMOVAL: Primary | ICD-10-CM

## 2024-02-13 PROCEDURE — 58301 REMOVE INTRAUTERINE DEVICE: CPT | Performed by: FAMILY MEDICINE

## 2024-02-13 NOTE — PROGRESS NOTES
Assessment & Plan     Encounter for IUD removal  IUD removed without difficulty today after pt verbal consent.   Reviewed prenatal vitamin/counseling. UTD on vaccines  She will reach out to my team when pregnant and we will help schedule dating ultrasound and/or UPT confirmation/1st OB appt pending her preferences.   She is considering TOLAC vs repeat c-s and we will revisit this when pregnant.                   Subjective   Payton is a 28 year old, presenting for the following health issues:  IUD (Removal )        2/13/2024     2:39 PM   Additional Questions   Roomed by Du X     HPI     Would like IUD removed; ready for TTC for baby #2    As an FYI for future IUDs:  Mirena IUD was placed in Nov 2022. The one in 2016 was easy placement; however the 2022 IUD post partum placement wasn't successful at 8 weeks pp and needed the ultrasound guided IUD placement which was very uncomfortable.                   Objective    /62 (BP Location: Left arm, Patient Position: Sitting, Cuff Size: Adult Regular)   Pulse 71   Temp 97.5  F (36.4  C) (Temporal)   Resp 16   Wt 53.1 kg (117 lb)   SpO2 96%   BMI 19.56 kg/m    Body mass index is 19.56 kg/m .  Physical Exam   General: alert, no acute distress  : Normal external genitalia with Normal vulva.  Normal vagina with no polyps or lesions and with physiologic discharge.  Normal cervix with normal mucosa and without CMT.  No adnexal masses IUD strings visualized and removed with ring forceps              Signed Electronically by: Anya Palmer MD

## 2024-06-17 ENCOUNTER — MYC MEDICAL ADVICE (OUTPATIENT)
Dept: FAMILY MEDICINE | Facility: CLINIC | Age: 29
End: 2024-06-17
Payer: COMMERCIAL

## 2024-06-17 NOTE — TELEPHONE ENCOUNTER
Do you want to see patient sooner.  Looks like it is about 7 weeks. BERRY AHN on 6/17/2024 at 2:33 PM

## 2024-06-18 ENCOUNTER — PATIENT OUTREACH (OUTPATIENT)
Dept: CARE COORDINATION | Facility: CLINIC | Age: 29
End: 2024-06-18
Payer: COMMERCIAL

## 2024-07-08 SDOH — HEALTH STABILITY: PHYSICAL HEALTH: ON AVERAGE, HOW MANY MINUTES DO YOU ENGAGE IN EXERCISE AT THIS LEVEL?: 30 MIN

## 2024-07-08 SDOH — HEALTH STABILITY: PHYSICAL HEALTH: ON AVERAGE, HOW MANY DAYS PER WEEK DO YOU ENGAGE IN MODERATE TO STRENUOUS EXERCISE (LIKE A BRISK WALK)?: 3 DAYS

## 2024-07-08 ASSESSMENT — SOCIAL DETERMINANTS OF HEALTH (SDOH): HOW OFTEN DO YOU GET TOGETHER WITH FRIENDS OR RELATIVES?: ONCE A WEEK

## 2024-07-10 ENCOUNTER — OFFICE VISIT (OUTPATIENT)
Dept: FAMILY MEDICINE | Facility: CLINIC | Age: 29
End: 2024-07-10
Payer: COMMERCIAL

## 2024-07-10 VITALS
OXYGEN SATURATION: 100 % | RESPIRATION RATE: 12 BRPM | HEIGHT: 65 IN | TEMPERATURE: 97.7 F | BODY MASS INDEX: 20.49 KG/M2 | WEIGHT: 123 LBS | SYSTOLIC BLOOD PRESSURE: 98 MMHG | DIASTOLIC BLOOD PRESSURE: 66 MMHG | HEART RATE: 77 BPM

## 2024-07-10 DIAGNOSIS — Z98.891 HISTORY OF CESAREAN SECTION: ICD-10-CM

## 2024-07-10 DIAGNOSIS — Z86.39 HISTORY OF THYROID DISORDER: ICD-10-CM

## 2024-07-10 DIAGNOSIS — N91.2 AMENORRHEA: Primary | ICD-10-CM

## 2024-07-10 DIAGNOSIS — N89.8 VAGINAL DISCHARGE: ICD-10-CM

## 2024-07-10 LAB
CLUE CELLS: NORMAL
TRICHOMONAS, WET PREP: NORMAL
TSH SERPL DL<=0.005 MIU/L-ACNC: 2.71 UIU/ML (ref 0.3–4.2)
WBC'S/HIGH POWER FIELD, WET PREP: NORMAL
YEAST, WET PREP: NORMAL

## 2024-07-10 PROCEDURE — 84443 ASSAY THYROID STIM HORMONE: CPT | Performed by: FAMILY MEDICINE

## 2024-07-10 PROCEDURE — 36415 COLL VENOUS BLD VENIPUNCTURE: CPT | Performed by: FAMILY MEDICINE

## 2024-07-10 PROCEDURE — 99214 OFFICE O/P EST MOD 30 MIN: CPT | Performed by: FAMILY MEDICINE

## 2024-07-10 PROCEDURE — 87210 SMEAR WET MOUNT SALINE/INK: CPT | Performed by: FAMILY MEDICINE

## 2024-07-10 PROCEDURE — G2211 COMPLEX E/M VISIT ADD ON: HCPCS | Performed by: FAMILY MEDICINE

## 2024-07-10 NOTE — PATIENT INSTRUCTIONS
Congratulations !!   At today's visit we will order dating ultrasound which usually done between 6-8wk of gestational age or ideally by 13 weeks, and reviewed goals to start a prenatal vitamin and also if needed medications to help nausea/vomiting.      -  Hospital: I have delivery privileges at [facility] ([place]) and [facility] ( [place] ).   Hospital Website: [email or web address]    -  Doctor at delivery: The plan is for me to follow you through your pregnancy and be there at your delivery (with planned c section).  If I am out of town, or unavailable, I work in a group of about 20 family med-ob doctors and someone from that group will cover for me. The rounding doctor on call will also likely assist with seeing you and your baby after the delivery.     -  Timeline for prenatal visits: I will see you every 4 weeks until 28 weeks, then every 2 weeks until 36 weeks, then every week until 40 weeks (your due date).  If you would like to book more than one visit in advance, you can do that so that you have your preferred appointment times.    -  Ultrasounds: One ultrasound that all pregnant women get is at about 20 weeks to look at the baby's anatomy (brain, heart, kidneys, etc) and growth.  Often we also do an ultrasound early in pregnancy to confirm the due date though this is not always covered by insurance fully so be sure to call to confirm coverage if you have concerns.  Sometimes we have to do other ultrasounds in pregnancy if issues arise.      -  Concerning symptoms: If you have any of the following things, contact me or my clinic: vaginal bleeding, cramping that won't go away, abdominal / belly pain, burning with urination, or anything other symptoms that concern you. Please do not rely on [employer] messages--> call if you have concerns that seem urgent to you.    -  Vitamins: It is important to take a prenatal vitamin daily during the pregnancy and continue  this after birth if breast feeding.    -  Medications in pregnancy: it is desired not to give medications during pregnancy, but many times we have to.  Some medications are safer than others.  All over the counter medications will tell you to ask your doctor about the medication if you are pregnant.  We will give you a list of medications are considered safe during pregnancy.      -  Nutrition: Some people say that when you are pregnant you are eating for two.  Really, the amount of calories needed when pregnant is not that much greater than when not pregnant.  As a result, I recommend focusing on eating as healthy as possible and your body will make sure the baby gets the necessary nutrients.     -  Exercise: It is important to stay active during your pregnancy.   You don't need to buy a gym membership or do intense exercise.  Something as simple as getting out on a walk every day will help.  Labor can be a lot of work, so staying active during your pregnancy will help you be in the right condition for your labor.       -  Weight gain in pregnancy: The amount of weight to gain in pregnancy depends on a patient's before becoming pregnant.  The usual suggestion is to gain between 20 to 25 pounds.  If you weigh more than the suggested weight for your height prior to getting pregnant, it is suggested that you gain less weight in pregnancy (approximately 15 to 25 pounds).  You gain most of your weight in the second (starts at 14 weeks) and third (starts at 28 weeks) trimesters.    -  Hazards: Avoid smoking, alcohol and recreational drugs during pregnancy.  Avoid overheating (for example with hot tubs or summer heat).  Don't change a cat's litter box due to a potential bacterial that can be in the litter box called toxoplasma.  Avoid certain foods due to the risk of different types of bacteria: raw meat and unpasteurized milk (so milk from the grocery store is okay but not straight from a farm).    -  A special word  "about cheeses: some soft cheeses (such as feta, Brie, Camembert, blue-veined cheese, and Mexican-style cheese like \"queso vogel fresco\") are made from unpasteurized milk (also called raw milk) and can carry disease-causing bacteria like listeria.  The CDC recommends not eating the above cheeses while pregnant.  Always check the label before eating soft cheese to be sure it's pasteurized. If you have any doubt (for example, if it's served at a party and you can't look at the package) don't eat it.  Cottage cheese, ricotta, cream cheese, mozzarella, processed cheese (such as American), and hard cheese (such as cheddar and Parmesan), as well as cultured dairy products like yogurt and buttermilk, are generally considered safe, either because they re made with pasteurized milk or because they're processed in ways that help inhibit the growth of the bacteria.    -  Deli meats: It is recommended that deli meats, lunch meats and hot dogs are all eaten hot (heated up to be steaming hot) and not at refrigerated or room temperatures.    -  Early pregnancy screening for birth defects (like spina bifida and down syndrome): which is done between 11-13 wk which is a 2 step process ( blood and ultrasound )and usually we send you to genetic Counselor to order the test and for counseling      This is optional testing and the decision to do this or not do this is different for everyone.  There is not one correct choice for everyone.  The right choice for you is the choice that best suits you.  I help guide a patient to make  right choice for her by reviewing her risk factors and her family history   For example, the risk for down syndrome increases with age, >35 higher risk than a 25 year old patient.     Other choice we offer is DNA free testing ( blood test )which is a blood test which can be done any time during pregnancy which help determine risk for down and other chromosomal abnormality. Please call your insurance if they cover " this test. Otherwise it's about $100-200 out of pocket.    Anya Palmer MD                More Information on Prenatal Genetic Testing  While most babies are born healthy, 3-5% of babies born each year will have a birth defect or genetic condition. Prenatal tests can give information about your baby s health before he or she is born. It is your choice to have these tests. Your doctor can help you decide if testing is right for you. All women get an ultrasound at 20-22 weeks to look at baby s growth and development. Meeting with a genetic counselor to further discuss your risks and to help you decide on testing options is always available to you as well.     What types of tests are available?  Screening test: predict the chance that your baby has a certain birth defect. If a screening test is positive, you have a higher risk of your baby being born with a genetic abnormality and your doctor may recommend a more invasive diagnostic test.   -Can detect many but not all cases of Down syndrome, trisomy 18, and spina bifida. These tests DO NOT detect any of the numerous other genetic changes or conditions that may mean your child could have special needs or handicaps that make your baby special.   -Risk of false positive test is 5%- positive test results but baby without any birth defects    Diagnostic test: tells you if your baby does or does not have a certain birth defect  -Risk of miscarriage with procedure - 1 in 100  -These tests are for women at high risk of having a baby with a birth defect or with a positive screening test.    What screening tests are available?  Nuchal translucency: ultrasound done at 10-14 weeks to measure space behind your baby s neck    Quad screen: blood test done at 15-19 weeks   -This is the most popular option for women wanting to know if they are at increased risk of having a baby with a birth defect.       Integrated screen: ultrasound and blood test at 10-14 weeks with second  blood test at 15-20 weeks  -Detects 90-95% of trisomy 21 and 18 babies    Cell free DNA testing: blood test any time after 10 weeks    What diagnostic tests are available? - done by doctor specializing in these procedures. If you require one of these tests, your doctor will help you get this arranged.     Chorionic villus sampling: procedure done at 11-14 weeks, small sample of placenta taken through vagina or abdomen    Amniocentesis: procedure done at 16-22 weeks, sample of fluid around baby take through abdomen    Who is at risk for having a baby with genetic abnormality or birth defect?  -woman age 35 or older  -if you have a family history of a genetic abnormality or birth defect  -woman who use alcohol, cigarettes, or drugs during pregnancy  -if you have taken certain medications during your pregnancy    Should I have these tests?  Deciding to have any of these tests, or which ones, is hard. There is no  right answer.  It can be helpful to think about what you would do with the test results. Here are some questions to ask yourself:  -Do I want any of this information?  -How would learning about these birth defects before my baby is born help me and my doctor prepare and plan?  -How would this information help me make choices about my pregnancy if a birth defect is found?  -Will having these tests help me feel more reassured?

## 2024-07-10 NOTE — PROGRESS NOTES
Assessment/Plan:      Amenorrhea.    UPT was positive at home.  Approx Gestational age: 8w0d today based on LMP. Patient's last menstrual period was 05/15/2024 (exact date). with clinical JUSTIN of 2025.    - Dating ultrasound ordered   - Prenatal vitamin recommended   - 1st trimester education reviewed: nutrition, smoking, alcohol & drug use and safe medications  - She will set up a first OB appointment at 10-12 weeks gestation- we reviewed options to est with ob/gyn for repeat  vs see me for routine care and be at delivery with ob/ygn as well- she prefers the latter for continuity of care and follow up.  - plans to have baby follow with me postpartum   - All questions that were asked were answered.   - Call or return to clinic if severe cramping or abdominal pain or any vaginal bleeding    - checking TSH with hx of needing thyroid medication last pregnancy; last TSH was <2.5 with us  - wet prep for vaginal discharge; no bleeding  - she would like NIPS screening at 1st OB  - will need feltal echo in 2nd tri for father with hx VSD      HISTORY OF PRESENT ILLNESS:  Meghann is a 28 year old female presenting to the clinic today for amenorrhea and pregnancy confirmation.   Chief Complaint   Patient presents with    Prenatal Care       Patient's last menstrual period was 05/15/2024 (exact date).  LMP 5/15/2024    Home UPT? Yes on 2024  Contraceptive method previously: Mirena IUD removed 2024  Menstrual hx: regular periods- started TTC around April for tracking sake  Symptoms of pregnancy: breast tenderness, fatigue, frequent urination, and nausea. No vaginal bleeding or abdominal pain since LMP.  She did have light brown discharge Sat to  and changed to more yellow color. No recent intercourse from onset of this discharge (4d prior). She states this happened with her prior pregnancy as well. No fishy odor.   Noticing more insomnia lately  If pregnant, pregnancy is: planned,  "desired    Remainder of 12-point ROS is negative.      Planning repeat  as she was told after the first one that \"there's no way she would have come out vaginally\"- at 41 weeks 8lb 7oz; pushed for 3hr; had SROM 26hr and Triple I and needed .     She was on thyroid medication (levothyroxine 25mcg) with her first pregnancy in  a few months prior to conception where TSH was 3.2 (goal <2.5 for fertility). 10/2022 TSH post partum was 3.34.      with congenital heart disease (born with a \"hole in his heart\"- suspected VSD)  With last pregnancy she had fetal echo ordered for her daughter.     We reviewed her upcoming travel - [place] in Sept (she is planning to not go with risk for long flight 24hr and [place]).     TSH   Date Value Ref Range Status   2023 2.07 0.30 - 4.20 uIU/mL Final           Social History     Social History Narrative    . Daughter Annalise -csection for SROM 26hr, Triple I, fetal decels after 3hr pushing. 8lb7oz despite Measuring small at 41 weeks.         From [facility]. She is a  with  [employer].  works with [employer]. Social alcohol 1x/every few weeks. Never smoker. Loves biking, golf, and aerobic dance videos, like Elsa. Exercises 3x/week.   Anya Palmer MD         VITALS:  Vitals:    07/10/24 0846   BP: 98/66   Pulse: 77   Resp: 12   Temp: 97.7  F (36.5  C)   TempSrc: Oral   SpO2: 100%   Weight: 55.8 kg (123 lb)   Height: 1.651 m (5' 5\")     Wt Readings from Last 3 Encounters:   07/10/24 55.8 kg (123 lb)   24 53.1 kg (117 lb)   23 51.5 kg (113 lb 8 oz)     Body mass index is 20.47 kg/m .    PHYSICAL EXAM:  Constitutional: healthy, alert and no distress  Head: Normocephalic. Atraumatic   Neck: Neck supple. No adenopathy.   Cardiovascular: Regular rate and rhythm. No murmurs, clicks gallops or rub  Respiratory: Lungs clear to auscultation. No wheezing or crackles present   Abdomen:  Abdomen soft, non-tender. BS normal. " No masses, organomegaly  Musculoskeletal: extremities normal- no gross deformities noted and normal muscle tone  Skin: no suspicious lesions or rashes  Psychiatric: mentation appears normal and affect normal/bright  Declines  exam    RECENT RESULTS  No results found for this or any previous visit (from the past 48 hour(s)).    MEDICATIONS:  No current outpatient medications on file.         Anya Palmer MD    Answers submitted by the patient for this visit:  General Questionnaire (Submitted on 7/8/2024)  Chief Complaint: Chronic problems general questions HPI Form  How many minutes a day do you exercise enough to make your heart beat faster?: 20 to 29  How many days a week do you exercise enough to make your heart beat faster?: 3 or less  How many days per week do you miss taking your medication?: 0  General Concern (Submitted on 7/8/2024)  Chief Complaint: Chronic problems general questions HPI Form  What is the reason for your visit today?: Pregnancy  When did your symptoms begin?: More than a month  How would you describe these symptoms?: Mild  Are your symptoms:: Staying the same  Have you had these symptoms before?: Yes  Have you tried or received treatment for these symptoms before?: No

## 2024-07-11 RX ORDER — LEVOTHYROXINE SODIUM 25 UG/1
25 TABLET ORAL DAILY
Qty: 90 TABLET | Refills: 1 | Status: SHIPPED | OUTPATIENT
Start: 2024-07-11

## 2024-07-15 ENCOUNTER — HOSPITAL ENCOUNTER (OUTPATIENT)
Dept: ULTRASOUND IMAGING | Facility: CLINIC | Age: 29
Discharge: HOME | End: 2024-07-15
Attending: FAMILY MEDICINE | Admitting: FAMILY MEDICINE
Payer: COMMERCIAL

## 2024-07-15 DIAGNOSIS — N91.2 AMENORRHEA: ICD-10-CM

## 2024-07-15 PROCEDURE — 76801 OB US < 14 WKS SINGLE FETUS: CPT

## 2024-08-07 LAB
ABO/RH(D): NORMAL
ANTIBODY SCREEN: NEGATIVE
SPECIMEN EXPIRATION DATE: NORMAL

## 2024-08-08 ENCOUNTER — OFFICE VISIT (OUTPATIENT)
Dept: FAMILY MEDICINE | Facility: CLINIC | Age: 29
End: 2024-08-08
Payer: COMMERCIAL

## 2024-08-08 VITALS
OXYGEN SATURATION: 99 % | HEIGHT: 65 IN | SYSTOLIC BLOOD PRESSURE: 96 MMHG | WEIGHT: 125.4 LBS | DIASTOLIC BLOOD PRESSURE: 54 MMHG | HEART RATE: 91 BPM | BODY MASS INDEX: 20.89 KG/M2 | RESPIRATION RATE: 16 BRPM

## 2024-08-08 DIAGNOSIS — Z98.891 HISTORY OF CESAREAN SECTION: ICD-10-CM

## 2024-08-08 DIAGNOSIS — Z86.39 HISTORY OF THYROID DISORDER: ICD-10-CM

## 2024-08-08 DIAGNOSIS — Z34.81 ENCOUNTER FOR SUPERVISION OF OTHER NORMAL PREGNANCY IN FIRST TRIMESTER: Primary | ICD-10-CM

## 2024-08-08 DIAGNOSIS — Z82.79 FAMILY HISTORY OF VSD (VENTRICULAR SEPTAL DEFECT): ICD-10-CM

## 2024-08-08 LAB
ERYTHROCYTE [DISTWIDTH] IN BLOOD BY AUTOMATED COUNT: 13 % (ref 10–15)
HBV SURFACE AG SERPL QL IA: NONREACTIVE
HCT VFR BLD AUTO: 38 % (ref 35–47)
HGB BLD-MCNC: 12.9 G/DL (ref 11.7–15.7)
HIV 1+2 AB+HIV1 P24 AG SERPL QL IA: NONREACTIVE
MCH RBC QN AUTO: 30.1 PG (ref 26.5–33)
MCHC RBC AUTO-ENTMCNC: 33.9 G/DL (ref 31.5–36.5)
MCV RBC AUTO: 89 FL (ref 78–100)
PLATELET # BLD AUTO: 249 10E3/UL (ref 150–450)
RBC # BLD AUTO: 4.29 10E6/UL (ref 3.8–5.2)
T PALLIDUM AB SER QL: NONREACTIVE
TSH SERPL DL<=0.005 MIU/L-ACNC: 2.23 UIU/ML (ref 0.3–4.2)
WBC # BLD AUTO: 8.2 10E3/UL (ref 4–11)

## 2024-08-08 PROCEDURE — 87491 CHLMYD TRACH DNA AMP PROBE: CPT | Performed by: FAMILY MEDICINE

## 2024-08-08 PROCEDURE — 87591 N.GONORRHOEAE DNA AMP PROB: CPT | Performed by: FAMILY MEDICINE

## 2024-08-08 PROCEDURE — 86901 BLOOD TYPING SEROLOGIC RH(D): CPT | Performed by: FAMILY MEDICINE

## 2024-08-08 PROCEDURE — 36415 COLL VENOUS BLD VENIPUNCTURE: CPT | Performed by: FAMILY MEDICINE

## 2024-08-08 PROCEDURE — 87340 HEPATITIS B SURFACE AG IA: CPT | Performed by: FAMILY MEDICINE

## 2024-08-08 PROCEDURE — 86762 RUBELLA ANTIBODY: CPT | Performed by: FAMILY MEDICINE

## 2024-08-08 PROCEDURE — 86850 RBC ANTIBODY SCREEN: CPT | Performed by: FAMILY MEDICINE

## 2024-08-08 PROCEDURE — 87086 URINE CULTURE/COLONY COUNT: CPT | Performed by: FAMILY MEDICINE

## 2024-08-08 PROCEDURE — 87389 HIV-1 AG W/HIV-1&-2 AB AG IA: CPT | Performed by: FAMILY MEDICINE

## 2024-08-08 PROCEDURE — 84443 ASSAY THYROID STIM HORMONE: CPT | Performed by: FAMILY MEDICINE

## 2024-08-08 PROCEDURE — 86900 BLOOD TYPING SEROLOGIC ABO: CPT | Performed by: FAMILY MEDICINE

## 2024-08-08 PROCEDURE — 99213 OFFICE O/P EST LOW 20 MIN: CPT | Performed by: FAMILY MEDICINE

## 2024-08-08 PROCEDURE — 86780 TREPONEMA PALLIDUM: CPT | Performed by: FAMILY MEDICINE

## 2024-08-08 PROCEDURE — 85027 COMPLETE CBC AUTOMATED: CPT | Performed by: FAMILY MEDICINE

## 2024-08-08 RX ORDER — ONDANSETRON 4 MG/1
4 TABLET, ORALLY DISINTEGRATING ORAL EVERY 8 HOURS PRN
Qty: 30 TABLET | Refills: 2 | Status: SHIPPED | OUTPATIENT
Start: 2024-08-08

## 2024-08-08 NOTE — PROGRESS NOTES
First OB Visit     ASSESSMENT/PLAN    Estimated Date of Delivery: 2025  12w1d by sure LMP c/w 1TU exact dates    Encounter for supervision of other normal pregnancy in first trimester  Routine prenatal labs today.  NIPS today; ok to release gender on mychart    Pap smear is up to date.   Early ultrasound for dating reviewed/discussed today- due dates matched exactly  .   - Myriad Non-Invasive Prenatal Screening-Prequel; Future  - ondansetron (ZOFRAN ODT) 4 MG ODT tab; Take 1 tablet (4 mg) by mouth every 8 hours as needed for nausea  - ABO/Rh type and screen; Future  - Hepatitis B surface antigen; Future  - CBC with platelets; Future  - HIV Antigen Antibody Combo; Future  - Rubella Antibody IgG; Future  - Treponema Abs w Reflex to RPR and Titer; Future  - Urine Culture Aerobic Bacterial; Future  - Chlamydia & Gonorrhea by PCR, G[facility]/Range - Clinic Collect    History of  section  Planning repeat  with ob but would like to follow prenatal care with myself and if Ob willing will also be first assist    History of thyroid disorder  - TSH; Future    Family history of VSD (ventricular septal defect)  Will do fetal echo in 2nd trimester; MFM referral will be sent later visit             Discussed:  - Pre-pregnancy Body mass index is 20.87 kg/m .  - Recommended weight gain of  25-35 lbs for normal BMI.  - Influenza vaccine not yet due  - Genetic screening options, including false positive rate with screening tests and diagnostic options (chorionic villus sampling, amniocentesis), and patient accepts - ok to release gender on mychart    - Safe medications during pregnancy and prenatal vitamin daily discussed.   - Healthy habits including not using tobacco or alcohol, exercising regularly and maintaining healthy diet  - Information given on tips for dealing with nausea, healthy habits, exposures, safety, prenatal appointment schedule, and when to call the doctor.  - Recommendations for  breastfeeding given  -  Preeclampsia risk factors - at increased risk if 1+ high risk or 2+ moderate risk factors  High risk factors (1+):NONE  Moderate risk factors (2+): NONE  Based on her risk factors, Meghann is NOT at high risk of preeclampsia. Low-dose aspirin prophylaxis is NOT recommended for prevention of preeclampsia.        Follow up in 4 weeks for routine pre-henri care.     Anya Palmer MD      Next 5 appointments (look out 90 days)      Sep 05, 2024 8:30 AM  (Arrive by 8:20 AM)  Return OB Visit with Anya Palmer MD  [facility] ([facility] ) [address] [address]  [phone]     Oct 02, 2024 11:30 AM  (Arrive by 11:20 AM)  Return OB Visit with Anya Palmer MD  [facility] ([facility] ) [address] [address]  [phone]     Oct 31, 2024 3:00 PM  (Arrive by 2:50 PM)  Return OB Visit with Anya Palmer MD  [facility] ([facility] ) [address] [address]  [phone]                  SUBJECTIVE:  Meghann Burt is a 29 year old  female who presents to clinic for a new OB visit.     Patient's last menstrual period was 05/15/2024 (exact date).  Estimated Date of Delivery: 2025 via sure LMP c/w 1TUS (exact match)    She is at 12w1d gestation.    This was a planned pregnancy.     She has not had any bleeding, abdominal pain or cramping since her LMP. She has a lot of nausea. No vomiting.  B6 isn't helpful (taking 50mg in the morning); All day nausea.   She is really feeling down on herself about how the nausea is impacting what she enjoys doing.    Weight loss has not occurred.       Wt Readings from Last 3 Encounters:   24 56.9 kg (125 lb 6.4 oz)   07/10/24 55.8 kg (123 lb)   24 53.1 kg (117 lb)  "        OTHER CONCERNS: Does have a left sided ovarian cyst 5cm noted on her early ultrasound; some uncomfortable sided pain    Her obstetrical history is significant for    2018 : Daughter Annalise -csection for SROM 26hr, Triple I, fetal decels after 3hr pushing. 8lb7oz despite Measuring small at 41 weeks.     Planning repeat  as she was told after the first one that \"there's no way she would have come out vaginally\"- at 41 weeks 8lb 7oz; pushed for 3hr; had SROM 26hr and Triple I and needed .      She was on thyroid medication (levothyroxine 25mcg) with her first pregnancy in  a few months prior to conception where TSH was 3.2 (goal <2.5 for fertility). 10/2022 TSH post partum was 3.34.      with congenital heart disease (born with a \"hole in his heart\"- suspected VSD)  With last pregnancy she had fetal echo ordered for her daughter.       Relationship with FOB:   Patient does intend to breast feed.   Pregnancy history fully reviewed.    OB History    Para Term  AB Living   2 1 1 0 0 1   SAB IAB Ectopic Multiple Live Births   0 0 0 0 1      # Outcome Date GA Lbr Germán/2nd Weight Sex Type Anes PTL Lv   2 Current            1 Term 09/15/22 41w1d 15:03 / 02:58 3.84 kg (8 lb 7.5 oz) F CS-LTranv EPI N LILIANE      Complications: Fetal Intolerance, Cephalopelvic Disproportion      Name: ABDON DONNELLY      Apgar1: 8  Apgar5: 9      Obstetric Comments   csection for SROM 26hr, Triple I, fetal decels after 3hr pushing. 8lb7oz despite Measuring small at 41 weeks.        Social History     Social History Narrative    . Daughter Annalise -csection for SROM 26hr, Triple I, fetal decels after 3hr pushing. 8lb7oz despite Measuring small at 41 weeks.         From [facility]. She is a  with  [employer].  works with [employer]. Social alcohol 1x/every few weeks. Never smoker. Loves biking, golf, and aerobic dance videos, like Elsa. Exercises 3x/week.  " " Anya Palmer MD         Active Ambulatory Problems     Diagnosis Date Noted    IUD (intrauterine device) in place- Mirena, placed 16, remove 2019    Chest wall pain 2023    History of  section 07/10/2024     Resolved Ambulatory Problems     Diagnosis Date Noted    No Resolved Ambulatory Problems     No Additional Past Medical History       The following portions of the patient's history were reviewed and updated as appropriate: allergies, current medications, past family history, past medical history, past social history, past surgical history and problem list.    Review of Systems  A 12 point comprehensive review of systems was negative except as noted.      PHYSICAL EXAM:  BP 96/54 (BP Location: Left arm, Patient Position: Sitting, Cuff Size: Adult Regular)   Pulse 91   Resp 16   Ht 1.651 m (5' 5\")   Wt 56.9 kg (125 lb 6.4 oz)   LMP 05/15/2024 (Exact Date)   SpO2 99%   BMI 20.87 kg/m       GENERAL: Pleasant pregnant female, alert, well groomed.  SKIN: Warm and dry, without lesions or rashes  EYES: PERRLA, EOM intact  MOUTH: Buccal mucosa pink, moist without lesions.   NECK: Thyroid without enlargement and nodules. No cervical lymphadenopathy.  LUNGS: Clear to auscultation.  BREAST: defers   HEART: RRR without murmur.  ABDOMEN: Soft without masses , tenderness or organomegaly. No CVA tenderness. Fundus palpable at symphysis pubis. FHT 150s, + fetal movement by bedside ultrasound  MUSCULOSKELETAL: Full range of motion.  EXTREMITIES: No edema. No significant varicosities.   : deferred      Anya Palmer MD            "

## 2024-08-09 LAB
BACTERIA UR CULT: NORMAL
C TRACH DNA SPEC QL PROBE+SIG AMP: NEGATIVE
N GONORRHOEA DNA SPEC QL NAA+PROBE: NEGATIVE
RUBV IGG SERPL QL IA: 1.56 INDEX
RUBV IGG SERPL QL IA: POSITIVE

## 2024-08-09 NOTE — RESULT ENCOUNTER NOTE
Payton, all your OB labs look great.  Your blood type is B positive.  Looking forward to seeing you again in about 4 weeks, sooner if you have concerns.   Thanks,   Anya Palmer MD

## 2024-08-14 LAB — SCANNED LAB RESULT: NORMAL

## 2024-09-05 ENCOUNTER — PRENATAL OFFICE VISIT (OUTPATIENT)
Dept: FAMILY MEDICINE | Facility: CLINIC | Age: 29
End: 2024-09-05
Payer: COMMERCIAL

## 2024-09-05 VITALS
RESPIRATION RATE: 16 BRPM | BODY MASS INDEX: 21.73 KG/M2 | HEIGHT: 65 IN | HEART RATE: 67 BPM | OXYGEN SATURATION: 100 % | DIASTOLIC BLOOD PRESSURE: 60 MMHG | SYSTOLIC BLOOD PRESSURE: 92 MMHG | WEIGHT: 130.4 LBS

## 2024-09-05 DIAGNOSIS — Z34.82 ENCOUNTER FOR SUPERVISION OF OTHER NORMAL PREGNANCY IN SECOND TRIMESTER: Primary | ICD-10-CM

## 2024-09-05 DIAGNOSIS — Z86.39 HISTORY OF THYROID DISORDER: ICD-10-CM

## 2024-09-05 DIAGNOSIS — Z98.891 HISTORY OF CESAREAN SECTION: ICD-10-CM

## 2024-09-05 DIAGNOSIS — Z82.79 FAMILY HISTORY OF VSD (VENTRICULAR SEPTAL DEFECT): ICD-10-CM

## 2024-09-05 PROBLEM — R07.89 CHEST WALL PAIN: Status: RESOLVED | Noted: 2023-06-09 | Resolved: 2024-09-05

## 2024-09-05 PROCEDURE — 99000 SPECIMEN HANDLING OFFICE-LAB: CPT | Performed by: FAMILY MEDICINE

## 2024-09-05 PROCEDURE — 99207 PR PRENATAL VISIT: CPT | Performed by: FAMILY MEDICINE

## 2024-09-05 PROCEDURE — 36415 COLL VENOUS BLD VENIPUNCTURE: CPT | Performed by: FAMILY MEDICINE

## 2024-09-05 PROCEDURE — 82105 ALPHA-FETOPROTEIN SERUM: CPT | Mod: 90 | Performed by: FAMILY MEDICINE

## 2024-09-05 NOTE — PROGRESS NOTES
"ALANNAH  16w1d  Estimated Date of Delivery: 2025    Chief Complaint   Patient presents with    Prenatal Care         S:  Feeling \"way better\"; 12pm-2pm and around dinnertime are the times when she doesn't feel as good from nausea standpoint  Fatigue is improved    She is eating healthfully  Walking a lot for exercise    4hr flight @ 19 weeks; compression socks reviewed    Random left ovarian cyst pain ( has a 5cm known cyst; nothing consistent or bothersome)    Wt Readings from Last 3 Encounters:   24 59.1 kg (130 lb 6.4 oz)   24 56.9 kg (125 lb 6.4 oz)   07/10/24 55.8 kg (123 lb)       Not yet feeling fetal movement aside from a few possible flutters  No vaginal bleeding, cramping, LOF.   No headaches, vision change, RUQ abdominal pain, LE swelling.       O: Vitals reviewed, see prenatal flowsheet for measurements.   appears well, no acute distress.  Normal respiratory effort.  Abdomen is soft. No LE swelling.     A/P:     Encounter for supervision of other normal pregnancy in second trimester  Doing much better this trimester  -B positive, no rhogam needed  - Alpha fetoprotein maternal screen; Future  - US OB > 14 Weeks anatomy scan ordered  - Exp baby BOY! Nips neg.   - MSAFP today  - NEXT:  TSH, flu shot, with fetal echo referral    Hx c section   Planning repeat  with ob but would like to follow prenatal care with myself and if Ob willing will also be first assist   - plans repeat  with OB/myself; will place consult in early 3rd trim.     History of thyroid disorder  TSH 2., then 2.2024 (above goal <2.5 for fertility) and recently 2.2 on  after initiation of low dose levothyroxine 25mcg daily. Reviewed optimally rechecking 6-8 weeks after so will defer to next visit    Family history of VSD (ventricular septal defect)  Will do fetal echo in 2nd trimester  - MFM referral will be sent next visit      Follow up: 4 weeks      Anya Palmer MD    Next 5 " appointments (look out 90 days)      Oct 09, 2024 10:30 AM  (Arrive by 10:20 AM)  Return OB Visit with Anya Palmer MD  [facility] ([facility] ) [address] [place] [address]  [phone]     Oct 31, 2024 3:00 PM  (Arrive by 2:50 PM)  Return OB Visit with Anya Palmer MD  [facility] ([facility] ) [address] [place] [address]  [phone]     Nov 27, 2024 11:30 AM  (Arrive by 11:20 AM)  Return OB Visit with Anya Palmer MD  [facility] ([facility] ) [address] [place] [address]  [phone]

## 2024-09-05 NOTE — PATIENT INSTRUCTIONS
You can call the radiology department at [phone] to schedule your imaging test that was ordered.

## 2024-09-07 LAB
# FETUSES US: NORMAL
AFP MOM SERPL: 1.25
AFP SERPL-MCNC: 48 NG/ML
AGE - REPORTED: 31.6 YR
CURRENT SMOKER: NO
FAMILY MEMBER DISEASES HX: NO
GA METHOD: NORMAL
GA: NORMAL WK
IDDM PATIENT QL: NO
INTEGRATED SCN PATIENT-IMP: NORMAL
SPECIMEN DRAWN SERPL: NORMAL

## 2024-09-28 ENCOUNTER — HEALTH MAINTENANCE LETTER (OUTPATIENT)
Age: 29
End: 2024-09-28

## 2024-10-04 ENCOUNTER — HOSPITAL ENCOUNTER (OUTPATIENT)
Dept: ULTRASOUND IMAGING | Facility: CLINIC | Age: 29
Discharge: HOME | End: 2024-10-04
Attending: FAMILY MEDICINE | Admitting: FAMILY MEDICINE
Payer: COMMERCIAL

## 2024-10-04 PROCEDURE — 76805 OB US >/= 14 WKS SNGL FETUS: CPT

## 2024-10-06 ENCOUNTER — OFFICE VISIT (OUTPATIENT)
Dept: FAMILY MEDICINE | Facility: CLINIC | Age: 29
End: 2024-10-06
Payer: COMMERCIAL

## 2024-10-06 VITALS
WEIGHT: 132.7 LBS | BODY MASS INDEX: 22.08 KG/M2 | SYSTOLIC BLOOD PRESSURE: 105 MMHG | RESPIRATION RATE: 18 BRPM | HEART RATE: 104 BPM | DIASTOLIC BLOOD PRESSURE: 71 MMHG | OXYGEN SATURATION: 97 % | TEMPERATURE: 98.5 F

## 2024-10-06 DIAGNOSIS — J06.9 URI WITH COUGH AND CONGESTION: Primary | ICD-10-CM

## 2024-10-06 DIAGNOSIS — Z33.1 PREGNANCY, INCIDENTAL: ICD-10-CM

## 2024-10-06 DIAGNOSIS — J98.01 ACUTE BRONCHOSPASM: ICD-10-CM

## 2024-10-06 DIAGNOSIS — R53.83 FATIGUE, UNSPECIFIED TYPE: ICD-10-CM

## 2024-10-06 PROCEDURE — 94640 AIRWAY INHALATION TREATMENT: CPT | Performed by: FAMILY MEDICINE

## 2024-10-06 PROCEDURE — 99214 OFFICE O/P EST MOD 30 MIN: CPT | Mod: 25 | Performed by: FAMILY MEDICINE

## 2024-10-06 RX ORDER — ALBUTEROL SULFATE 0.83 MG/ML
2.5 SOLUTION RESPIRATORY (INHALATION) ONCE
Status: COMPLETED | OUTPATIENT
Start: 2024-10-06 | End: 2024-10-06

## 2024-10-06 RX ORDER — VITAMIN A, VITAMIN C, VITAMIN D-3, VITAMIN E, VITAMIN B-1, VITAMIN B-2, NIACIN, VITAMIN B-6, CALCIUM, IRON, ZINC, COPPER 4000; 120; 400; 22; 1.84; 3; 20; 10; 1; 12; 200; 27; 25; 2 [IU]/1; MG/1; [IU]/1; MG/1; MG/1; MG/1; MG/1; MG/1; MG/1; UG/1; MG/1; MG/1; MG/1; MG/1
TABLET ORAL DAILY
COMMUNITY

## 2024-10-06 RX ADMIN — ALBUTEROL SULFATE 2.5 MG: 0.83 SOLUTION RESPIRATORY (INHALATION) at 14:00

## 2024-10-07 ENCOUNTER — TELEPHONE (OUTPATIENT)
Dept: FAMILY MEDICINE | Facility: CLINIC | Age: 29
End: 2024-10-07
Payer: COMMERCIAL

## 2024-10-07 DIAGNOSIS — J06.9 URI WITH COUGH AND CONGESTION: Primary | ICD-10-CM

## 2024-10-07 RX ORDER — ALBUTEROL SULFATE 90 UG/1
2 INHALANT RESPIRATORY (INHALATION) EVERY 6 HOURS PRN
Qty: 18 G | Refills: 0 | Status: CANCELLED | OUTPATIENT
Start: 2024-10-07

## 2024-10-07 RX ORDER — ALBUTEROL SULFATE 90 UG/1
2 INHALANT RESPIRATORY (INHALATION) EVERY 6 HOURS PRN
Qty: 8.5 G | Refills: 11 | Status: SHIPPED | OUTPATIENT
Start: 2024-10-07

## 2024-10-07 NOTE — PROGRESS NOTES
Assessment/Plan:   1. URI with cough and congestion  - albuterol (PROVENTIL) neb solution 2.5 mg  - Optichamber/Spacer Order for DME - ONLY FOR DME  2. Fatigue, unspecified type  3. Pregnancy, incidental  4. Acute bronchospasm    29 year old female at 20 weeks EGA with prolonged URI for 2 weeks with ongoing congestion and cough, now new fatigue for 3 days. No fever. No edema or shortness of breath, no N/V/D, no sinus pain or green mucus. No chest pain or palpitations.   Differential includes a new sequential viral illness, secondary bacterial sinusitis or pneumonia, post viral persistent congestion and cough, seasonal allergies, post viral cardiomyopathy, anemia, less likely PE or preeclampsia.     An albuterol neb was given in the office and she initially did not feel much better but she had much better air movement on repeat auscultation with clearer lungs and noted that she had not been coughing as much and could take a bigger breath.     We elected not to do a chest xray today given the improved lung sounds and air movement heard after the albuterol nebulizer.   No definite bacterial sinus infection, just ongoing congestion.  We considered empiric antibiotics given the duration of illness and to cover a lower respiratory infection without doing a chest xray, but again with no definite sign of bacterial infection, elected to observe for now.     Continue fluids, rest, diet as tolerated.   Albuterol inhaler with spacer every 4 hours as needed for cough, shortness of breath, chest tightness.     Follow up as planned in 3 days with primary care for further evaluation of ongoing cough, fatigue, congestion.     Return sooner if high fevers, shortness of breath, chest pain, abdominal pain, leg swelling, or sinus pain.    I discussed red flag symptoms, return precautions to clinic/ER and follow up care with patient/parent.  Expected clinical course, symptomatic cares advised. Questions answered. Patient/parent amenable  with plan.  Time: total time on day of visit 38 minutes spent in chart review, obtaining patient history and physical exam, discussing need for labs, medication management, shared decision making and coordination of care.     Subjective:     Meghann Burt is a 29 year old female at about 20 weeks EGA who presents for congestion and cough.   She and her  and 2 year old all developed nasal congestion and cough and cold symptoms about 2 weeks ago. The other 2 are better now but her symptoms have persisted.   For the last 3 days she has felt really fatigued. More than typical pregnancy fatigue. Notices this when walking up stairs, or after doing some cleaning or holding her 2 year old she feels she needs to sit or lie down. No definite shortness of breath. No wheezing. No leg swelling or calf pain.   No fever or chills. No sinus pain, drainage is mostly clear. No headache or ST.   Cough sounds phlegmy.    She had an ultrasound on Friday that was good.   She has an appointment on Wednesday this week with OB/primary care provider.   Home covid tests negative once last week and once today.   No history of asthma.  No ticks. Did have a mosquito bite 3 weeks ago.     No Known Allergies    Current Outpatient Medications   Medication Sig Dispense Refill    levothyroxine (SYNTHROID/LEVOTHROID) 25 MCG tablet Take 1 tablet (25 mcg) by mouth daily 90 tablet 1    Prenatal Vit-Fe Fumarate-FA (PRENATAL MULTIVITAMIN  PLUS IRON) 27-1 MG TABS Take by mouth daily.      ondansetron (ZOFRAN ODT) 4 MG ODT tab Take 1 tablet (4 mg) by mouth every 8 hours as needed for nausea (Patient not taking: Reported on 2024) 30 tablet 2     No current facility-administered medications for this visit.     Patient Active Problem List   Diagnosis    IUD (intrauterine device) in place- Mirena, placed 16, remove     History of  section       Objective:     /71 (BP Location: Right arm, Patient Position: Sitting, Cuff  Size: Adult Regular)   Pulse 104   Temp 98.5  F (36.9  C) (Oral)   Resp 18   Wt 60.2 kg (132 lb 11.2 oz)   LMP 05/15/2024 (Exact Date)   SpO2 97%   BMI 22.08 kg/m      Physical    General Appearance: Alert, pleasant, no distress, AVSS. Initially O2 sats were 95%.   Head: Normocephalic, without obvious abnormality, atraumatic  Eyes: Conjunctivae are normal.   Ears: Normal TMs and external ear canals, both ears  Nose: congestion. No purulent drainage and no sinus pain   Throat: Throat is normal.  No exudate.  No vesicular lesions  Neck: No adenopathy  Lungs:  coarse rhonchi initially bilaterally, cleared a lot after a cough, still some possible wheezes bilaterally, respirations unlabored  Heart: Regular rate and rhythm  Abdomen: Soft, non-tender, gravid  Extremities: No lower extremity edema  Skin: Skin color, texture, turgor normal, no rashes or lesions  Psychiatric: Patient has a normal mood and affect.       This note has been dictated in part using voice recognition software.  Any grammatical or context distortions are unintentional and inherent to the software.  Please feel free to contact me directly for clarification if needed.

## 2024-10-07 NOTE — TELEPHONE ENCOUNTER
Patient was seen in UC yesterday and stated she was told she was getting an inhaler for her current sx. Neb rx is in her medications but I do not see that an inhaler was sent through.    TRUNG Hernandez   no

## 2024-10-07 NOTE — PATIENT INSTRUCTIONS
Continue fluids, rest, diet as tolerated.   Albuterol inhaler with spacer every 4 hours as needed for cough, shortness of breath, chest tightness.     Follow up as planned in 3 days with primary care for further evaluation of ongoing cough, fatigue, congestion.     We elected not to do a chest xray today given the improved lung sounds and air movement heard after the albuterol nebulizer.   No definite bacterial sinus infection, just ongoing congestion.     Return sooner if high fevers, shortness of breath, chest pain, abdominal pain, leg swelling, or sinus pain.

## 2024-10-09 ENCOUNTER — PRENATAL OFFICE VISIT (OUTPATIENT)
Dept: FAMILY MEDICINE | Facility: CLINIC | Age: 29
End: 2024-10-09
Payer: COMMERCIAL

## 2024-10-09 VITALS
DIASTOLIC BLOOD PRESSURE: 79 MMHG | OXYGEN SATURATION: 95 % | SYSTOLIC BLOOD PRESSURE: 115 MMHG | HEIGHT: 65 IN | BODY MASS INDEX: 22.31 KG/M2 | RESPIRATION RATE: 16 BRPM | HEART RATE: 120 BPM | TEMPERATURE: 98.1 F | WEIGHT: 133.9 LBS

## 2024-10-09 DIAGNOSIS — Z82.79 FAMILY HISTORY OF VSD (VENTRICULAR SEPTAL DEFECT): ICD-10-CM

## 2024-10-09 DIAGNOSIS — Z34.82 ENCOUNTER FOR SUPERVISION OF OTHER NORMAL PREGNANCY IN SECOND TRIMESTER: Primary | ICD-10-CM

## 2024-10-09 DIAGNOSIS — J06.9 VIRAL URI WITH COUGH: ICD-10-CM

## 2024-10-09 DIAGNOSIS — G93.39 OTHER POST INFECTION AND RELATED FATIGUE SYNDROMES: ICD-10-CM

## 2024-10-09 DIAGNOSIS — Z86.39 HISTORY OF THYROID DISORDER: ICD-10-CM

## 2024-10-09 DIAGNOSIS — Z98.891 HISTORY OF CESAREAN SECTION: ICD-10-CM

## 2024-10-09 LAB
ERYTHROCYTE [DISTWIDTH] IN BLOOD BY AUTOMATED COUNT: 12.8 % (ref 10–15)
HCT VFR BLD AUTO: 35.4 % (ref 35–47)
HGB BLD-MCNC: 12.1 G/DL (ref 11.7–15.7)
MCH RBC QN AUTO: 30.9 PG (ref 26.5–33)
MCHC RBC AUTO-ENTMCNC: 34.2 G/DL (ref 31.5–36.5)
MCV RBC AUTO: 90 FL (ref 78–100)
PLATELET # BLD AUTO: 267 10E3/UL (ref 150–450)
PROCALCITONIN SERPL IA-MCNC: 0.05 NG/ML
RBC # BLD AUTO: 3.92 10E6/UL (ref 3.8–5.2)
TSH SERPL DL<=0.005 MIU/L-ACNC: 2.14 UIU/ML (ref 0.3–4.2)
WBC # BLD AUTO: 9.9 10E3/UL (ref 4–11)

## 2024-10-09 PROCEDURE — 85027 COMPLETE CBC AUTOMATED: CPT | Performed by: FAMILY MEDICINE

## 2024-10-09 PROCEDURE — 99214 OFFICE O/P EST MOD 30 MIN: CPT | Mod: 25 | Performed by: FAMILY MEDICINE

## 2024-10-09 PROCEDURE — 99207 PR COMPLICATED OB VISIT: CPT | Performed by: FAMILY MEDICINE

## 2024-10-09 PROCEDURE — 84443 ASSAY THYROID STIM HORMONE: CPT | Performed by: FAMILY MEDICINE

## 2024-10-09 PROCEDURE — 84145 PROCALCITONIN (PCT): CPT | Performed by: FAMILY MEDICINE

## 2024-10-09 PROCEDURE — 36415 COLL VENOUS BLD VENIPUNCTURE: CPT | Performed by: FAMILY MEDICINE

## 2024-10-09 RX ORDER — GUAIFENESIN 600 MG/1
1200 TABLET, EXTENDED RELEASE ORAL 2 TIMES DAILY
Qty: 30 TABLET | Refills: 0 | Status: SHIPPED | OUTPATIENT
Start: 2024-10-09

## 2024-10-09 NOTE — PATIENT INSTRUCTIONS
Flonase nasal spray daily    Faith pot    Albuterol inhaler as needed    Guaifenesin Rx for cough

## 2024-10-10 ENCOUNTER — TELEPHONE (OUTPATIENT)
Dept: FAMILY MEDICINE | Facility: CLINIC | Age: 29
End: 2024-10-10
Payer: COMMERCIAL

## 2024-10-10 DIAGNOSIS — Z82.79 FAMILY HISTORY OF VSD (VENTRICULAR SEPTAL DEFECT): Primary | ICD-10-CM

## 2024-10-10 NOTE — TELEPHONE ENCOUNTER
----- Message from Yue DELGADO sent at 10/10/2024 10:13 AM CDT -----  Regarding: Order in my WQ  I have been sent an order for [facility] Referral - Pregnancy. Are you looking to have a fetal echo done for patient. If so, please update the referral to a fetal echo and send records as well.    Thank you.    Yue Tyler    [facility]  [facility]  [phone]

## 2024-10-10 NOTE — TELEPHONE ENCOUNTER
Order updated however to me it looks the same as previously and I've not ordered it differently from this in the past. Please let me know if there's anything I need to change.

## 2024-10-11 ENCOUNTER — TELEPHONE (OUTPATIENT)
Dept: CARDIOLOGY | Facility: CLINIC | Age: 29
End: 2024-10-11
Payer: COMMERCIAL

## 2024-10-15 DIAGNOSIS — Z82.79 FAMILY HISTORY OF CONGENITAL HEART DEFECT: Primary | ICD-10-CM

## 2024-10-21 ENCOUNTER — MYC MEDICAL ADVICE (OUTPATIENT)
Dept: FAMILY MEDICINE | Facility: CLINIC | Age: 29
End: 2024-10-21
Payer: COMMERCIAL

## 2024-10-21 ENCOUNTER — NURSE TRIAGE (OUTPATIENT)
Dept: FAMILY MEDICINE | Facility: CLINIC | Age: 29
End: 2024-10-21
Payer: COMMERCIAL

## 2024-10-21 NOTE — TELEPHONE ENCOUNTER
Nurse Triage SBAR    Is this a 2nd Level Triage? NO    Situation: Rectal bleeding with stool occurrence X1    Background: Patient has had hemorrhoids with bleeding last pregnancy, as well.    Assessment:   1. APPEARANCE of BLOOD:   Bright red bleeding noted on stool     2. AMOUNT:   Just with wiping after stool. Not dripping or any flow. Stops after wiping.     3. FREQUENCY:   Just once with Bm today this morning 10/21    4. ONSET:   10/21    5. DIARRHEA:   no    6. CONSTIPATION:   mild, using Colace         7. RECURRENT SYMPTOMS:    Yes had some bleeding with hemorrhoids in the past when pregnant     8. BLOOD THINNERS:   no     9. OTHER SYMPTOMS:   no     10. PREGNANCY:   yes, 22w 5d    Protocol Recommended Disposition:   See in Office Within 3 Days    Recommendation: Patient is agreeable with plan. Appointment made for 10/23 with PCP.       Reason for Disposition   MILD rectal bleeding (more than just a few drops or streaks)    Additional Information   Negative: Passed out (i.e., fainted, collapsed and was not responding)   Negative: Shock suspected (e.g., cold/pale/clammy skin, too weak to stand, low BP, rapid pulse)   Negative: Vomiting red blood or black (coffee ground) material   Negative: Sounds like a life-threatening emergency to the triager   Negative: Diarrhea is main symptom   Negative: Rectal symptoms   Negative: SEVERE rectal bleeding (large blood clots; constant or on and off bleeding)   Negative: SEVERE dizziness (e.g., unable to stand, requires support to walk, feels like passing out now)   Negative: MODERATE rectal bleeding (small blood clots, passing blood without stool, or toilet water turns red) more than once a day   Negative: Bloody, black, or tarry bowel movements  (Exception: Chronic-unchanged black-grey bowel movements and is taking iron pills or Pepto-Bismol.)   Negative: High-risk adult (e.g., prior surgery on aorta, abdominal aortic aneurysm)   Negative: Rectal foreign body (inserted or  swallowed)   Negative: SEVERE abdominal pain (e.g., excruciating)   Negative: Constant abdominal pain lasting > 2 hours   Negative: Pale skin (pallor) of new-onset or worsening   Negative: Patient sounds very sick or weak to the triager   Negative: MODERATE rectal bleeding (small blood clots, passing blood without stool, or toilet water turns red)   Negative: Taking Coumadin (warfarin) or other strong blood thinner, or known bleeding disorder (e.g., thrombocytopenia)   Negative: Colonoscopy in past 72 hours   Negative: Known cirrhosis of the liver (or history of liver failure or ascites)   Negative: Patient wants to be seen    Protocols used: Rectal Bleeding-A-OH

## 2024-10-23 ENCOUNTER — OFFICE VISIT (OUTPATIENT)
Dept: FAMILY MEDICINE | Facility: CLINIC | Age: 29
End: 2024-10-23
Payer: COMMERCIAL

## 2024-10-23 VITALS
HEART RATE: 96 BPM | SYSTOLIC BLOOD PRESSURE: 90 MMHG | OXYGEN SATURATION: 98 % | BODY MASS INDEX: 22.78 KG/M2 | RESPIRATION RATE: 16 BRPM | DIASTOLIC BLOOD PRESSURE: 60 MMHG | HEIGHT: 65 IN | WEIGHT: 136.7 LBS

## 2024-10-23 DIAGNOSIS — K62.5 RECTAL BLEEDING: Primary | ICD-10-CM

## 2024-10-23 PROBLEM — E03.8 SUBCLINICAL HYPOTHYROIDISM: Status: ACTIVE | Noted: 2022-01-26

## 2024-10-23 PROBLEM — Z97.5 IUD (INTRAUTERINE DEVICE) IN PLACE: Status: RESOLVED | Noted: 2019-05-22 | Resolved: 2024-10-23

## 2024-10-23 PROCEDURE — 99213 OFFICE O/P EST LOW 20 MIN: CPT | Mod: 25 | Performed by: FAMILY MEDICINE

## 2024-10-23 PROCEDURE — 90471 IMMUNIZATION ADMIN: CPT | Performed by: FAMILY MEDICINE

## 2024-10-23 PROCEDURE — 90656 IIV3 VACC NO PRSV 0.5 ML IM: CPT | Performed by: FAMILY MEDICINE

## 2024-10-23 PROCEDURE — 90480 ADMN SARSCOV2 VAC 1/ONLY CMP: CPT | Performed by: FAMILY MEDICINE

## 2024-10-23 PROCEDURE — 91320 SARSCV2 VAC 30MCG TRS-SUC IM: CPT | Performed by: FAMILY MEDICINE

## 2024-10-23 NOTE — PROGRESS NOTES
Assessment & Plan     Rectal bleeding  We reviewed likely contributing factor of internal hemorrhoid bleeding.  The symptoms are essentially resolved at this time.  Possibility for minor fissure in the setting of constipation which prompted this bright red blood per rectum hemorrhoid bleeding spell.  She has no concerns for pain at this point.  She has a follow-up OB appointment with us next week and if symptoms are worsened we can follow-up with an exam at that time which she defers today which is not unreasonable.  - INFLUENZA VACCINE,SPLIT VIRUS,TRIVALENT,PF(FLUZONE)  - COVID-19 12+ (PFIZER)      The longitudinal plan of care for the diagnosis(es)/condition(s) as documented were addressed during this visit. Due to the added complexity in care, I will continue to support Payton in the subsequent management and with ongoing continuity of care.        Subjective   Payton is a 29 year old, presenting for the following health issues:  Rectal Problem        10/23/2024     9:17 AM   Additional Questions   Roomed by Angie ALONSO MA     History of Present Illness       Reason for visit:  Rectal bleeding.  Symptom onset:  1-3 days ago  Symptom intensity:  Mild  Symptom progression:  Improving  Had these symptoms before:  No   She is taking medications regularly.     Felt constipated, took a colace but the next AM had a BM with bright red blood on Monday AM  Hx of hemorrhoids (external) with prior pregnancy  Did  have mild pain with passing the BM  Yesterday some blood with wiping at the rectal area  Has had a daily BM and resolving      From the prior viral URI she took about 3 weeks to get through but overall doing so much better. Sleeping so much better. Energy back. Not feeling fatigued. Didn't need the inhaler anymore and mucinex after a couple weeks. Still doing flonase                      Objective    BP 90/60 (BP Location: Left arm, Patient Position: Sitting, Cuff Size: Adult Regular)   Pulse 96   Resp 16   Ht  "1.651 m (5' 5\")   Wt 62 kg (136 lb 11.2 oz)   LMP 05/15/2024 (Exact Date)   SpO2 98%   BMI 22.75 kg/m    Body mass index is 22.75 kg/m .  Physical Exam   General: Alert, no acute distress.   HEENT: normocephalic conjunctivae are clear. EOMI  Neck: supple   Lungs: Normal effort  Heart: regular rate  Abdomen: soft   Skin: clear without rash or lesions  Neuro: alert, oriented  Psych: mood good, affect appropriate, good eye contact, insight and judgment intact, normal speech pattern.              Signed Electronically by: Anya Palmer MD    "

## 2024-10-31 ENCOUNTER — PRENATAL OFFICE VISIT (OUTPATIENT)
Dept: FAMILY MEDICINE | Facility: CLINIC | Age: 29
End: 2024-10-31
Payer: COMMERCIAL

## 2024-10-31 VITALS
RESPIRATION RATE: 16 BRPM | HEIGHT: 65 IN | WEIGHT: 138.5 LBS | DIASTOLIC BLOOD PRESSURE: 67 MMHG | SYSTOLIC BLOOD PRESSURE: 100 MMHG | BODY MASS INDEX: 23.07 KG/M2 | HEART RATE: 103 BPM | OXYGEN SATURATION: 98 %

## 2024-10-31 DIAGNOSIS — Z86.39 HISTORY OF THYROID DISORDER: ICD-10-CM

## 2024-10-31 DIAGNOSIS — Z98.891 HISTORY OF CESAREAN SECTION: ICD-10-CM

## 2024-10-31 DIAGNOSIS — Z34.82 ENCOUNTER FOR SUPERVISION OF OTHER NORMAL PREGNANCY IN SECOND TRIMESTER: Primary | ICD-10-CM

## 2024-10-31 PROCEDURE — 99207 PR PRENATAL VISIT: CPT | Performed by: FAMILY MEDICINE

## 2024-10-31 NOTE — PROGRESS NOTES
ALANNAH  24w1d      Estimated Date of Delivery: 2025    Chief Complaint   Patient presents with    Prenatal Care     S: no issues with the rectal bleeding since our last visit  Feeling pretty well overall. Minor nausea remains- some heartburn.   Walking a lot for exercise  Wt Readings from Last 3 Encounters:   10/31/24 62.8 kg (138 lb 8 oz)   10/23/24 62 kg (136 lb 11.2 oz)   10/09/24 60.7 kg (133 lb 14.4 oz)       + fetal movement   No vaginal bleeding, cramping, LOF.   No headaches, vision change, RUQ abdominal pain, LE swelling.     O: Vitals reviewed, see prenatal flowsheet for measurements.   appears well, no acute distress.  Abdomen is soft. No LE swelling.       A/P:     Encounter for supervision of other normal pregnancy in second trimester  Doing much better this trimester  -B positive, no rhogam needed  - Exp baby BOY!! nips neg, MSAFP neg  - EFW 60%ile @ 20wks  - COVID/Flu shots given     Hx c section   Planning repeat  with ob but would like to follow prenatal care with myself and if Ob willing will also be first assist   - plans repeat  with OB/myself; will place consult in early 3rd trim.     History of thyroid disorder  TSH 2., then 2.2024 (above goal <2.5 for fertility) and recently 2.2 on  after initiation of low dose levothyroxine 25mcg daily.   - recheck 10/9 normal range at 2.14      Family history of VSD (ventricular septal defect) in father  Will do fetal echo in 2nd trimester  - scheduled for       Follow up: 4 weeks      Anya Palmer MD    Next 5 appointments (look out 90 days)      Oct 31, 2024 3:00 PM  (Arrive by 2:50 PM)  Return OB Visit with Anya Palmer MD  [facility] ([facility] ) [address] [place] [address]  [phone]     2024 11:30 AM  (Arrive by 11:20 AM)  Return OB Visit with Anya Palmer MD  [facility]  [place] ([facility] ) [address] [facility] [address]  [phone]     Dec 12, 2024 1:00 PM  (Arrive by 12:50 PM)  Return OB Visit with Anya Palmer MD  [facility] ([facility] ) [address] [facility] [address]  [phone]     Dec 26, 2024 9:30 AM  (Arrive by 9:20 AM)  Return OB Visit with Anya Palmer MD  [facility] ([facility] ) [address] [address]  [phone]

## 2024-11-05 ENCOUNTER — HOSPITAL ENCOUNTER (OUTPATIENT)
Dept: CARDIOLOGY | Facility: CLINIC | Age: 29
Discharge: HOME | End: 2024-11-05
Attending: FAMILY MEDICINE | Admitting: FAMILY MEDICINE
Payer: COMMERCIAL

## 2024-11-05 ENCOUNTER — OFFICE VISIT (OUTPATIENT)
Dept: CARDIOLOGY | Facility: CLINIC | Age: 29
End: 2024-11-05
Payer: COMMERCIAL

## 2024-11-05 DIAGNOSIS — Z82.79 FAMILY HISTORY OF CONGENITAL HEART DEFECT: ICD-10-CM

## 2024-11-05 DIAGNOSIS — O35.BXX0 FETAL CARDIAC DISEASE AFFECTING PREGNANCY, SINGLE OR UNSPECIFIED FETUS: Primary | ICD-10-CM

## 2024-11-05 PROCEDURE — 99202 OFFICE O/P NEW SF 15 MIN: CPT | Mod: 25 | Performed by: PEDIATRICS

## 2024-11-05 PROCEDURE — 93325 DOPPLER ECHO COLOR FLOW MAPG: CPT | Mod: 26 | Performed by: PEDIATRICS

## 2024-11-05 PROCEDURE — 93325 DOPPLER ECHO COLOR FLOW MAPG: CPT

## 2024-11-05 PROCEDURE — 76825 ECHO EXAM OF FETAL HEART: CPT | Mod: 26 | Performed by: PEDIATRICS

## 2024-11-05 PROCEDURE — 76827 ECHO EXAM OF FETAL HEART: CPT | Mod: 26 | Performed by: PEDIATRICS

## 2024-11-05 NOTE — PROGRESS NOTES
Fetal Cardiology Consultation    Patient:  Meghann Burt MRN:  [id number]   YOB: 1995 Age:  29 year old   Date of Visit:  11/5/2024 PCP:  Anya Palmer MD   JUSTIN: 2/19/2025, by Last Menstrual Period EGA: 24w6d weeks     Dear Doctor,     I had the pleasure of seeing eMghann Burt at the [facility] in [place] on 11/5/2024 in consultation for fetal echocardiography results. She presented today accompanied by her partner. As you know, she is a 29 year old female with family history of paternal history of a ventricular septal defect.    The fetal echocardiogram was normal. Normal fetal cardiac anatomy. Normal right and left ventricular size and function without hypertrophy. No pericardial effusion. No arrhythmia.     I reviewed and interpreted the fetal echocardiogram today. I discussed the normal results with Ms. Burt and her partner. While these results are normal, it is important to note that fetal echocardiography cannot exclude small atrial or ventricular septal defects, persistent ductus arteriosus, mild coarctation of the aorta, partial anomalous pulmonary venous return, minor anatomic valve anomalies, or coronary artery anomalies.     -- No additional fetal echocardiograms are recommended for this pregnancy.    Thank you for allowing me to participate in Ms. Burt's care. Please don't hesitate to contact me or the Fetal Cardiology team at [facility] with any questions or concerns.     I spent a total of 20 minutes on the date of the encounter doing chart review, patient history, documentation, counseling, and coordinating care.    Linda High MD  Pediatric Cardiology  [facility]  Phone [phone]

## 2024-11-18 ENCOUNTER — MYC MEDICAL ADVICE (OUTPATIENT)
Dept: FAMILY MEDICINE | Facility: CLINIC | Age: 29
End: 2024-11-18
Payer: COMMERCIAL

## 2024-11-18 DIAGNOSIS — Z20.828 EXPOSURE TO PARVOVIRUS: Primary | ICD-10-CM

## 2024-11-20 ENCOUNTER — LAB (OUTPATIENT)
Dept: LAB | Facility: CLINIC | Age: 29
End: 2024-11-20
Payer: COMMERCIAL

## 2024-11-20 DIAGNOSIS — Z20.828 EXPOSURE TO PARVOVIRUS: ICD-10-CM

## 2024-11-23 LAB
B19V IGG SER IA-ACNC: 0.31 IV
B19V IGM SER IA-ACNC: 0.15 IV

## 2024-11-27 ENCOUNTER — PRENATAL OFFICE VISIT (OUTPATIENT)
Dept: FAMILY MEDICINE | Facility: CLINIC | Age: 29
End: 2024-11-27
Payer: COMMERCIAL

## 2024-11-27 VITALS
DIASTOLIC BLOOD PRESSURE: 73 MMHG | WEIGHT: 140.6 LBS | OXYGEN SATURATION: 96 % | BODY MASS INDEX: 23.43 KG/M2 | RESPIRATION RATE: 16 BRPM | SYSTOLIC BLOOD PRESSURE: 108 MMHG | HEART RATE: 99 BPM | HEIGHT: 65 IN

## 2024-11-27 DIAGNOSIS — Z20.828 EXPOSURE TO PARVOVIRUS: ICD-10-CM

## 2024-11-27 DIAGNOSIS — Z82.79 FAMILY HISTORY OF VSD (VENTRICULAR SEPTAL DEFECT): ICD-10-CM

## 2024-11-27 DIAGNOSIS — Z34.82 ENCOUNTER FOR SUPERVISION OF OTHER NORMAL PREGNANCY IN SECOND TRIMESTER: Primary | ICD-10-CM

## 2024-11-27 DIAGNOSIS — Z98.891 HISTORY OF CESAREAN SECTION: ICD-10-CM

## 2024-11-27 DIAGNOSIS — Z86.39 HISTORY OF THYROID DISORDER: ICD-10-CM

## 2024-11-27 LAB
ERYTHROCYTE [DISTWIDTH] IN BLOOD BY AUTOMATED COUNT: 12.9 % (ref 10–15)
GLUCOSE 1H P 50 G GLC PO SERPL-MCNC: 120 MG/DL (ref 70–129)
HCT VFR BLD AUTO: 35.6 % (ref 35–47)
HGB BLD-MCNC: 12.2 G/DL (ref 11.7–15.7)
MCH RBC QN AUTO: 31.3 PG (ref 26.5–33)
MCHC RBC AUTO-ENTMCNC: 34.3 G/DL (ref 31.5–36.5)
MCV RBC AUTO: 91 FL (ref 78–100)
PLATELET # BLD AUTO: 188 10E3/UL (ref 150–450)
RBC # BLD AUTO: 3.9 10E6/UL (ref 3.8–5.2)
T PALLIDUM AB SER QL: NONREACTIVE
TSH SERPL DL<=0.005 MIU/L-ACNC: 2.14 UIU/ML (ref 0.3–4.2)
WBC # BLD AUTO: 6.8 10E3/UL (ref 4–11)

## 2024-11-27 NOTE — PROGRESS NOTES
"ALANNAH  24w1d      Estimated Date of Delivery: 2025    Chief Complaint   Patient presents with    Prenatal Care     S: Overall doing well    Recently called our office with regard to exposure to Parvovirus and we had her IgG and IgM levels checked which were normal on .     Her daughter Marla had red cheeks 2 days ago but no other symptoms.    Having more pelvic pain and pressure which was similar to her last pregnancy - going to try a belly band and cat/cow yoga poses     Wt Readings from Last 3 Encounters:   10/31/24 62.8 kg (138 lb 8 oz)   10/23/24 62 kg (136 lb 11.2 oz)   10/09/24 60.7 kg (133 lb 14.4 oz)       + fetal movement   No vaginal bleeding, cramping, LOF.   No headaches, vision change, RUQ abdominal pain, LE swelling.     O: Vitals reviewed, see prenatal flowsheet for measurements.   appears well, no acute distress.  Abdomen is soft. No LE swelling.       A/P:     Encounter for supervision of other normal pregnancy in second trimester  Doing much better this trimester  -B positive, no rhogam needed  - Exp baby BOY!! nips neg, MSAFP neg  - EFW 60%ile @ 20wks  - COVID/Flu shots given   - tdap defer to next visit  - 1hr GTT today with CBC, TSH, syphilis    Exposure to Parvovirus  Exposure at 27weeks gestation. IgG and IgM levels negative on 2024. Will collect Parvovirus PCR today and repeat labs in IgG and IgM labs 4 weeks      Hx c section   Planning repeat  with ob but would like to follow prenatal care with myself and if Ob willing will also be first assist   - plans repeat  with OB/myself; will place consult today  Planning repeat  as she was told after the first one that \"there's no way she would have come out vaginally\"- at 41 weeks 8lb 7oz; pushed for 3hr; had SROM 26hr and Triple I and needed .     History of thyroid disorder  TSH 2., then 2.2024 (above goal <2.5 for fertility) and recently 2.2 on  after initiation of low dose " levothyroxine 25mcg daily.   - recheck 10/9 normal range at 2.14      Family history of VSD (ventricular septal defect) in father  Normal fetal echo in 2nd trimester        Follow up: 2 weeks      Anya Palmer MD    Next 5 appointments (look out 90 days)      Nov 27, 2024 11:30 AM  (Arrive by 11:20 AM)  Return OB Visit with Anya Palmer MD  [facility] ([facility]) [address] [place] [address]  [phone]     Dec 12, 2024 1:00 PM  (Arrive by 12:50 PM)  Return OB Visit with Anya Palmer MD  [facility] ([facility]) [address] [place] [address]  [phone]     Dec 26, 2024 9:30 AM  (Arrive by 9:20 AM)  Return OB Visit with Anya Palmer MD  [facility] ([facility]) [address] [place] [address]  [phone]     Jan 09, 2025 9:30 AM  (Arrive by 9:20 AM)  Return OB Visit with Anya Palmer MD  [facility] ([facility]) [address] [place] [address]  [phone]     Jan 23, 2025 9:30 AM  (Arrive by 9:20 AM)  Return OB Visit with Anya Palmer MD  [facility] ([facility]) [address] [place] [phone]  [phone]

## 2024-11-27 NOTE — PATIENT INSTRUCTIONS
Non-Constipating Iron Supplement:    Gentle Iron by Emilie (Iron bisglycinate) 25mg once daily.  It is formulated with vegetable based iron sources and can be found at the [employer] in [place] and probably available online too. It comes in a brown/nina glass container with gold label/lid.     You can also increase your iron levels by eating more iron in your diet. Good sources of iron are actually iron-fortified cereals (like All Bran, or Cream of Wheat). Iron can be found in red meats such as beef, steak, liver; as well as almonds, lentil beans, chickpeas, green leafy vegetables like spinach, peaches, prune juice and raisins.       [facility] OB number:  [phone], ext #[phone] for scheduling  [email or web address]    They have a few locations, closest here is at the [facility].Please confirm appointment location at time of scheduling.    [address], [address]  [place] [address]  (Enter through the main doors of hospital entrance)

## 2024-12-01 LAB — B19V DNA SER QL NAA+PROBE: NOT DETECTED

## 2024-12-12 ENCOUNTER — PRENATAL OFFICE VISIT (OUTPATIENT)
Dept: FAMILY MEDICINE | Facility: CLINIC | Age: 29
End: 2024-12-12
Payer: COMMERCIAL

## 2024-12-12 VITALS
DIASTOLIC BLOOD PRESSURE: 75 MMHG | SYSTOLIC BLOOD PRESSURE: 117 MMHG | WEIGHT: 143.38 LBS | OXYGEN SATURATION: 98 % | HEART RATE: 80 BPM | HEIGHT: 65 IN | BODY MASS INDEX: 23.89 KG/M2 | RESPIRATION RATE: 18 BRPM

## 2024-12-12 DIAGNOSIS — Z20.828 EXPOSURE TO PARVOVIRUS: ICD-10-CM

## 2024-12-12 DIAGNOSIS — Z98.891 HISTORY OF CESAREAN SECTION: ICD-10-CM

## 2024-12-12 DIAGNOSIS — Z34.83 ENCOUNTER FOR SUPERVISION OF OTHER NORMAL PREGNANCY IN THIRD TRIMESTER: Primary | ICD-10-CM

## 2024-12-12 NOTE — PROGRESS NOTES
"ALANNAH  30w1d    Estimated Date of Delivery: 2025    Chief Complaint   Patient presents with    Prenatal Care     30 week     S: Overall doing well    Has consult with Dr Huerta scheduled for  for plans of repeat      Increased work related stressors as her boss will be out for surgery in early  right before Payton has her  in Feb.  I encouraged options for counseling and Payton will explore employee provided services with focus on work-life boundaries    Wt Readings from Last 3 Encounters:   24 65 kg (143 lb 6 oz)   24 63.8 kg (140 lb 9.6 oz)   10/31/24 62.8 kg (138 lb 8 oz)       + fetal movement   No vaginal bleeding, cramping, LOF.   No headaches, vision change, RUQ abdominal pain, LE swelling.     O: Vitals reviewed, see prenatal flowsheet for measurements.   appears well, no acute distress.  Abdomen is soft. No LE swelling.       A/P:     Encounter for supervision of other normal pregnancy in second trimester  Doing much better this trimester  -B positive, no rhogam needed  - Exp baby BOY!! nips neg, MSAFP neg  - EFW 60%ile @ 20wks  - COVID/Flu shots given   - tdap 2024   - 1hr GGT normal; TSH 2.14 on     Exposure to Parvovirus  Exposure at 27weeks gestation. IgG and IgM levels negative on 2024. Will collect Parvovirus PCR today and repeat labs in IgG and IgM labs 4 weeks  --> will repeat these on  visit      Hx c section   Planning repeat  with ob but would like to follow prenatal care with myself and if OB willing will I can also be first assist   - plans repeat  with OB/myself; consult scheduled with Dr Huerta on 2025.     Planning repeat  as she was told after the first one that \"there's no way she would have come out vaginally\"- at 41 weeks 8lb 7oz; pushed for 3hr; had SROM 26hr and Triple I and needed .     History of thyroid disorder  TSH 2., then 2.2024 (above goal <2.5 for " fertility) and recently 2.2 on 8/8 after initiation of low dose levothyroxine 25mcg daily.   - recheck 11/27 normal range at 2.14      Family history of VSD (ventricular septal defect) in father  Normal fetal echo in 2nd trimester      Follow up: 2 weeks      Anya Palmer MD      Dec 26, 2024 9:30 AM  (Arrive by 9:20 AM)  Return OB Visit with Anya Palmer MD  [facility] ([facility]) [address] [place] [phone]  [phone]     Jan 09, 2025 9:30 AM  (Arrive by 9:20 AM)  Return OB Visit with Anya Palmer MD  [facility] ([facility]) [address] [place] [address]  [phone]     Jan 23, 2025 9:30 AM  (Arrive by 9:20 AM)  Return OB Visit with Anya Palmer MD  [facility] ([facility]) [address] [place] [phone]  [phone]

## 2024-12-12 NOTE — PATIENT INSTRUCTIONS
"Breast Pumps:  [email or web address]  Click on the pink button at top right of the webpage for the \"fast track order form\" and use the breast pump prescription that we provided today for this process.    \"Layne Antony is an accredited and authorized DME provider specializing in breast pumps and insurance billing. Most insurance plans cover a breast pump following the Affordable Care Act guidelines. We do all the hard work of contacting your insurance to verify your coverage and eligibility. We ship, free of charge, nation wide via [employer] (1-5 business days) for all pump orders!\"        "

## 2024-12-26 ENCOUNTER — PRENATAL OFFICE VISIT (OUTPATIENT)
Dept: FAMILY MEDICINE | Facility: CLINIC | Age: 29
End: 2024-12-26
Payer: COMMERCIAL

## 2024-12-26 VITALS
HEIGHT: 65 IN | OXYGEN SATURATION: 98 % | RESPIRATION RATE: 16 BRPM | BODY MASS INDEX: 23.96 KG/M2 | WEIGHT: 143.8 LBS | HEART RATE: 108 BPM | SYSTOLIC BLOOD PRESSURE: 102 MMHG | DIASTOLIC BLOOD PRESSURE: 68 MMHG

## 2024-12-26 DIAGNOSIS — Z98.891 HISTORY OF CESAREAN SECTION: ICD-10-CM

## 2024-12-26 DIAGNOSIS — Z20.828 EXPOSURE TO PARVOVIRUS: ICD-10-CM

## 2024-12-26 DIAGNOSIS — J02.9 SORE THROAT: ICD-10-CM

## 2024-12-26 DIAGNOSIS — Z34.83 ENCOUNTER FOR SUPERVISION OF OTHER NORMAL PREGNANCY IN THIRD TRIMESTER: Primary | ICD-10-CM

## 2024-12-26 LAB
DEPRECATED S PYO AG THROAT QL EIA: NEGATIVE
FLUAV RNA SPEC QL NAA+PROBE: NEGATIVE
FLUBV RNA RESP QL NAA+PROBE: NEGATIVE
RSV RNA SPEC NAA+PROBE: NEGATIVE
S PYO DNA THROAT QL NAA+PROBE: NOT DETECTED
SARS-COV-2 RNA RESP QL NAA+PROBE: NEGATIVE

## 2024-12-26 NOTE — PROGRESS NOTES
ALANNAH  32w1d      Estimated Date of Delivery: 2025    Chief Complaint   Patient presents with    Prenatal Care     32WK OB- Woke up with Sore throats     S: this morning woke with a sore throat - no fevers. Feels a cold is starting  Sick toddler at home.   Feeling well from the pregnancy standpoint; stress with prep for the holidays and more tired  Some pelvic soreness starting again with this pregnancy; feeling better when resting- thinking about getting a belly band.     Has consult with Dr Huerta scheduled for  for plans of repeat    She is now a bit more anxious about planning a repeat cs (recovery with a toddler ) and wanted to hear more about TOLAC conversation which we reviewed today in depth again     Increased work related stressors as her boss will be out for surgery in early  right before Payton has her  in Feb.    I encouraged options for counseling and Payton will explore employee provided services with focus on work-life boundaries    Wt Readings from Last 3 Encounters:   24 65.2 kg (143 lb 12.8 oz)   24 65 kg (143 lb 6 oz)   24 63.8 kg (140 lb 9.6 oz)       + fetal movement   No vaginal bleeding, cramping, LOF.   No headaches, vision change, RUQ abdominal pain, LE swelling.     O: Vitals reviewed, see prenatal flowsheet for measurements.   appears well, no acute distress.  Abdomen is soft. No LE swelling.       A/P:     Encounter for supervision of other normal pregnancy in second trimester  Doing much better this trimester  -B positive, no rhogam needed  - Exp baby BOY!! nips neg, MSAFP neg  - EFW 60%ile @ 20wks  - COVID/Flu shots given   - tdap 2024   - 1hr GGT normal; TSH 2.14 on     Exposure to Parvovirus  Exposure at 27weeks gestation. IgG and IgM levels negative on 2024. Will collect Parvovirus PCR today and repeat labs in IgG and IgM labs 4 weeks  --> will repeat these on  visit (today)      Hx c section   Planning repeat  " with ob but would like to follow prenatal care with myself and if OB willing will I can also be first assist   - plans repeat  with OB/myself; consult scheduled with Dr Huerta on 2025.   - reviewed TOLAC form and risks/benefits of these outcomes; she will take form with to her OB consult and consider signing with myself vs Deanna pending the conversation at that time.     To review: Planning repeat  as she was told after the first one that \"there's no way she would have come out vaginally\"- at 41 weeks 8lb 7oz; pushed for 3hr; had SROM 26hr and Triple I and needed .     History of thyroid disorder  TSH 2., then 2.2024 (above goal <2.5 for fertility) and recently 2.2 on  after initiation of low dose levothyroxine 25mcg daily.   - recheck  normal range at 2.14      Family history of VSD (ventricular septal defect) in father  Normal fetal echo in 2nd trimester      Sore throat  - COVID/flu/RSV and strep swabs today- would be open to flu/covid treatment if positive.     Follow up: 2 weeks      Anya Palmer MD      Dec 26, 2024 9:30 AM  (Arrive by 9:20 AM)  Return OB Visit with Anya Palmer MD  [facility] ([facility]) [address] [place] [address]  [phone]     2025 9:30 AM  (Arrive by 9:20 AM)  Return OB Visit with Anya Palmer MD  [facility] ([facility]) [address] [facility] [address]  [phone]     2025 9:30 AM  (Arrive by 9:20 AM)  Return OB Visit with Anya Palmer MD  [facility] ([facility]) [address] [facility] [address]  [phone]                "

## 2024-12-29 LAB
B19V IGG SER IA-ACNC: 0.42 IV
B19V IGM SER IA-ACNC: 0.14 IV

## 2025-01-01 DIAGNOSIS — Z86.39 HISTORY OF THYROID DISORDER: ICD-10-CM

## 2025-01-02 RX ORDER — LEVOTHYROXINE SODIUM 25 UG/1
25 TABLET ORAL DAILY
Qty: 90 TABLET | Refills: 1 | Status: SHIPPED | OUTPATIENT
Start: 2025-01-02

## 2025-01-06 ENCOUNTER — TRANSFERRED RECORDS (OUTPATIENT)
Dept: HEALTH INFORMATION MANAGEMENT | Facility: CLINIC | Age: 30
End: 2025-01-06
Payer: COMMERCIAL

## 2025-01-07 ENCOUNTER — MEDICAL CORRESPONDENCE (OUTPATIENT)
Dept: HEALTH INFORMATION MANAGEMENT | Facility: CLINIC | Age: 30
End: 2025-01-07
Payer: COMMERCIAL

## 2025-01-09 ENCOUNTER — PRENATAL OFFICE VISIT (OUTPATIENT)
Dept: FAMILY MEDICINE | Facility: CLINIC | Age: 30
End: 2025-01-09
Payer: COMMERCIAL

## 2025-01-09 VITALS
HEIGHT: 65 IN | OXYGEN SATURATION: 99 % | TEMPERATURE: 97.9 F | SYSTOLIC BLOOD PRESSURE: 98 MMHG | WEIGHT: 145.4 LBS | HEART RATE: 101 BPM | DIASTOLIC BLOOD PRESSURE: 62 MMHG | RESPIRATION RATE: 16 BRPM | BODY MASS INDEX: 24.22 KG/M2

## 2025-01-09 DIAGNOSIS — Z34.83 ENCOUNTER FOR SUPERVISION OF OTHER NORMAL PREGNANCY IN THIRD TRIMESTER: Primary | ICD-10-CM

## 2025-01-09 DIAGNOSIS — Z86.39 HISTORY OF THYROID DISORDER: ICD-10-CM

## 2025-01-09 DIAGNOSIS — Z98.891 HISTORY OF CESAREAN SECTION: ICD-10-CM

## 2025-01-09 NOTE — PROGRESS NOTES
ALANNAH  34w1d      Estimated Date of Delivery: 2025    Chief Complaint   Patient presents with    Prenatal Care     S:     Had consult with Dr Huerta - confirmed plans of repeat - scheduled now for   at 12:30pm @ [facility]    She has noticed varicose veins in the vulvar area and consistent with her low pelvic discomfort.     Her daughter's  has Norovirus going around and waiting for this to hit their family- we reviewed hydration in pregnancy and options if needing IVFs such as ADS  through our health system.    Wt Readings from Last 3 Encounters:   25 66 kg (145 lb 6.4 oz)   24 65.2 kg (143 lb 12.8 oz)   24 65 kg (143 lb 6 oz)       + fetal movement   No vaginal bleeding, cramping, LOF.   No headaches, vision change, RUQ abdominal pain, LE swelling.     O: Vitals reviewed, see prenatal flowsheet for measurements.   appears well, no acute distress.  Abdomen is soft. No LE swelling.   Declined  exam for the varicosity evaluation however pt feels confident that's what she noticed with mirrored self exam     A/P:     Encounter for supervision of other normal pregnancy in second trimester  Doing much better this trimester  -B positive, no rhogam needed  - Exp baby BOY!! nips neg, MSAFP neg. Desires Circ.   - EFW 60%ile @ 20wks  - COVID/Flu shots given   - tdap 2024   - RSV 2025   - 1hr GGT normal; TSH 2.14 on   - SIGNED TOLAC FORM 2025  with her decision for REPEAT CS- sent to scan    Exposure to Parvovirus  Exposure at 27weeks gestation. IgG and IgM levels negative on 2024. All repeat labs were negative.     Hx c section   Planning repeat  with ob but would like to follow prenatal care with myself and if OB willing will I can also be first assist   - plans repeat  with OB/myself; consult scheduled with Dr Huerta on 2025.   - reviewed TOLAC form and risks/benefits of these outcomes; she will take form with to her OB consult and  "consider signing with myself vs Deanna pending the conversation at that time.     To review: Planning repeat  as she was told after the first one that \"there's no way she would have come out vaginally\"- at 41 weeks 8lb 7oz; pushed for 3hr; had SROM 26hr and Triple I and needed .     History of thyroid disorder  TSH 2., then 2.2024 (above goal <2.5 for fertility) and recently 2.2 on  after initiation of low dose levothyroxine 25mcg daily.   - recheck  normal range at 2.14      Family history of VSD (ventricular septal defect) in father  Normal fetal echo in 2nd trimester      Follow up: 2 weeks      Anya Palmer MD      Next 5 appointments (look out 90 days)      2025 11:30 AM  (Arrive by 11:20 AM)  Return OB Visit with Anya Palmer MD  [facility] ([facility]) [address] [place] [address]  [phone]     2025 9:30 AM  (Arrive by 9:20 AM)  Return OB Visit with Anya Palmer MD  [facility] ([facility]) [address] [place] [address]  [phone]     2025 9:30 AM  (Arrive by 9:20 AM)  Return OB Visit with Anya Palmer MD  [facility] ([facility]) [address] [place] [address]  [phone]     2025 11:30 AM  (Arrive by 11:20 AM)  Return OB Visit with Anya Palmer MD  [facility] ([facility]) [address] [place] [address]  [phone]                    "

## 2025-01-15 RX ORDER — LIDOCAINE 40 MG/G
CREAM TOPICAL
OUTPATIENT
Start: 2025-01-15

## 2025-01-15 RX ORDER — LOPERAMIDE HYDROCHLORIDE 2 MG/1
2 CAPSULE ORAL
OUTPATIENT
Start: 2025-01-15

## 2025-01-15 RX ORDER — MISOPROSTOL 200 UG/1
800 TABLET ORAL
OUTPATIENT
Start: 2025-01-15

## 2025-01-15 RX ORDER — TRANEXAMIC ACID 10 MG/ML
1 INJECTION, SOLUTION INTRAVENOUS EVERY 30 MIN PRN
OUTPATIENT
Start: 2025-01-15

## 2025-01-15 RX ORDER — OXYTOCIN 10 [USP'U]/ML
10 INJECTION, SOLUTION INTRAMUSCULAR; INTRAVENOUS
OUTPATIENT
Start: 2025-01-15

## 2025-01-15 RX ORDER — OXYTOCIN/0.9 % SODIUM CHLORIDE 30/500 ML
340 PLASTIC BAG, INJECTION (ML) INTRAVENOUS CONTINUOUS PRN
OUTPATIENT
Start: 2025-01-15

## 2025-01-15 RX ORDER — SODIUM CHLORIDE, SODIUM LACTATE, POTASSIUM CHLORIDE, CALCIUM CHLORIDE 600; 310; 30; 20 MG/100ML; MG/100ML; MG/100ML; MG/100ML
INJECTION, SOLUTION INTRAVENOUS CONTINUOUS
OUTPATIENT
Start: 2025-01-15

## 2025-01-15 RX ORDER — CEFAZOLIN SODIUM/WATER 2 G/20 ML
2 SYRINGE (ML) INTRAVENOUS SEE ADMIN INSTRUCTIONS
OUTPATIENT
Start: 2025-01-15

## 2025-01-15 RX ORDER — LOPERAMIDE HYDROCHLORIDE 2 MG/1
4 CAPSULE ORAL
OUTPATIENT
Start: 2025-01-15

## 2025-01-15 RX ORDER — CITRIC ACID/SODIUM CITRATE 334-500MG
30 SOLUTION, ORAL ORAL
OUTPATIENT
Start: 2025-01-15

## 2025-01-15 RX ORDER — METHYLERGONOVINE MALEATE 0.2 MG/ML
200 INJECTION INTRAVENOUS
OUTPATIENT
Start: 2025-01-15

## 2025-01-15 RX ORDER — CEFAZOLIN SODIUM/WATER 2 G/20 ML
2 SYRINGE (ML) INTRAVENOUS
OUTPATIENT
Start: 2025-01-15

## 2025-01-15 RX ORDER — OXYTOCIN/0.9 % SODIUM CHLORIDE 30/500 ML
100-340 PLASTIC BAG, INJECTION (ML) INTRAVENOUS CONTINUOUS PRN
OUTPATIENT
Start: 2025-01-15

## 2025-01-15 RX ORDER — MISOPROSTOL 200 UG/1
400 TABLET ORAL
OUTPATIENT
Start: 2025-01-15

## 2025-01-15 RX ORDER — CARBOPROST TROMETHAMINE 250 UG/ML
250 INJECTION, SOLUTION INTRAMUSCULAR
OUTPATIENT
Start: 2025-01-15

## 2025-01-15 RX ORDER — ACETAMINOPHEN 325 MG/1
975 TABLET ORAL ONCE
OUTPATIENT
Start: 2025-01-15 | End: 2025-01-15

## 2025-01-28 ENCOUNTER — HOSPITAL ENCOUNTER (OUTPATIENT)
Facility: CLINIC | Age: 30
Discharge: HOME | End: 2025-01-29
Attending: FAMILY MEDICINE | Admitting: FAMILY MEDICINE
Payer: COMMERCIAL

## 2025-01-28 VITALS
DIASTOLIC BLOOD PRESSURE: 61 MMHG | SYSTOLIC BLOOD PRESSURE: 108 MMHG | RESPIRATION RATE: 16 BRPM | HEART RATE: 77 BPM | TEMPERATURE: 97.7 F

## 2025-01-28 PROBLEM — Z36.89 ENCOUNTER FOR TRIAGE IN PREGNANT PATIENT: Status: ACTIVE | Noted: 2025-01-28

## 2025-01-28 PROCEDURE — G0463 HOSPITAL OUTPT CLINIC VISIT: HCPCS

## 2025-01-28 RX ORDER — LIDOCAINE 40 MG/G
CREAM TOPICAL
Status: DISCONTINUED | OUTPATIENT
Start: 2025-01-28 | End: 2025-01-29 | Stop reason: HOSPADM

## 2025-01-29 NOTE — PROGRESS NOTES
Pt arrived at [facility] unit with decreased fetal movement. Pt states she has only felt 4 movements in the last hour despite drinking chocolate milk. Pt escorted to triage room, placed on EFM, and VS obtained. FHTs found immediately, audible movement heard, VS WNL. Will continue gathering NST.

## 2025-01-29 NOTE — PROGRESS NOTES
"Cat 1 reactive strip noted. MD updated. Pt states she can feel baby moving \"so much more\". Orders obtained to discharge home. Discharge instructions given, all questions answered. Pt escorted to elevator.  "

## 2025-01-30 ENCOUNTER — PRENATAL OFFICE VISIT (OUTPATIENT)
Dept: FAMILY MEDICINE | Facility: CLINIC | Age: 30
End: 2025-01-30
Payer: COMMERCIAL

## 2025-01-30 VITALS
SYSTOLIC BLOOD PRESSURE: 102 MMHG | DIASTOLIC BLOOD PRESSURE: 66 MMHG | RESPIRATION RATE: 18 BRPM | HEART RATE: 99 BPM | OXYGEN SATURATION: 97 % | HEIGHT: 65 IN | TEMPERATURE: 97.8 F | BODY MASS INDEX: 24.76 KG/M2 | WEIGHT: 148.6 LBS

## 2025-01-30 DIAGNOSIS — Z98.891 HISTORY OF CESAREAN SECTION: ICD-10-CM

## 2025-01-30 DIAGNOSIS — I86.3 VULVAR VARICOSE VEINS: ICD-10-CM

## 2025-01-30 DIAGNOSIS — Z34.83 ENCOUNTER FOR SUPERVISION OF OTHER NORMAL PREGNANCY IN THIRD TRIMESTER: Primary | ICD-10-CM

## 2025-01-30 DIAGNOSIS — Z86.39 HISTORY OF THYROID DISORDER: ICD-10-CM

## 2025-01-30 NOTE — PROGRESS NOTES
"ALANNAH  37w1d      Estimated Date of Delivery: Feb 19, 2025    Chief Complaint   Patient presents with    Prenatal Care     37 week OB     S: She was in L&D on 1/28 for concern for decreased fetal movement though when she got there baby was moving; reassuring Cat 1 strip; anterior placenta making it hard for her to  fetal movements as easily this pregnancy she thinks    To review that day: she felt less movement through the day; then 7-10pm is usually the \"gymnastics routine\" and it wasn't the case; so she had chocolate milk and did kick counts for 4 in first hour and 4 in the 2nd hour.  When she got to L&D they gave her sprite and baby was very active.  They watched her for about 1 hour.    She states her baby is back to regular activity now    Wt Readings from Last 3 Encounters:   01/24/25 67.7 kg (149 lb 3.2 oz)   01/09/25 66 kg (145 lb 6.4 oz)   12/26/24 65.2 kg (143 lb 12.8 oz)       + fetal movement   No vaginal bleeding, cramping, LOF.   No headaches, vision change, RUQ abdominal pain, LE swelling.     O: Vitals reviewed, see prenatal flowsheet for measurements.  Right sided varicose veins noted at right vulvar area; minimally tender; no palpable masses    A/P:     Encounter for supervision of other normal pregnancy in 3rd trimester  Doing much better this trimester  -B positive, no rhogam needed  - Exp baby BOY!! nips neg, MSAFP neg. Desires Circ in hospital.    - EFW 60%ile @ 20wks  - COVID/Flu shots given   - tdap 12/12/2024   - RSV 1/9/2025   - 1hr GGT normal; TSH 2.14 on 11/27  - SIGNED TOLAC FORM 1/9/2025  with her decision for REPEAT CS- sent to scan     Varicose veins on the right side   reviewed conservative measures  Reviewed indications for imaging if pain, redness, swelling worsens to r/o clot however right now her varicosities appear classic    Exposure to Parvovirus  Exposure at 27weeks gestation. IgG and IgM levels negative on 11/20/2024 and All repeat labs were negative.     Hx c section " "  Planning repeat    - plans repeat  with OB/myself; consult scheduled with Dr Huerta on 2025.   - TOLAC form signed with her decision for repeat , see media tab 25    To review: Planning repeat  as she was told after the first one that \"there's no way she would have come out vaginally\"- at 41 weeks 8lb 7oz; pushed for 3hr; had SROM 26hr and Triple I and needed .     History of thyroid disorder  TSH 2., then 2.2024 (above goal <2.5 for fertility) and recently 2.2 on  after initiation of low dose levothyroxine 25mcg daily.   - recheck  normal range at 2.14      Family history of VSD (ventricular septal defect) in father  Normal fetal echo in 2nd trimester      Follow up: weekly until delivery    Anya Palmer MD      Next 5 appointments (look out 90 days)      2025 9:30 AM  (Arrive by 9:20 AM)  Return OB Visit with Anya Palmer MD  [facility] ([facility]) [address] [place] [id number]  [phone]     2025 11:30 AM  (Arrive by 11:20 AM)  Return OB Visit with Anya Palmer MD  [facility] ([facility]) [address] [place] [id number]  [phone]     2025 11:30 AM  (Arrive by 11:20 AM)  Return OB Visit with Anya Palmer MD  [facility] ([facility]) [address] [place] [address]  [phone]     2025 2:00 PM  (Arrive by 1:50 PM)  Return OB Visit with Anya Palmer MD  [facility] ([facility]) [address] [place] [id number] [phone]                    "

## 2025-02-04 ENCOUNTER — MYC MEDICAL ADVICE (OUTPATIENT)
Dept: FAMILY MEDICINE | Facility: CLINIC | Age: 30
End: 2025-02-04
Payer: COMMERCIAL

## 2025-02-04 DIAGNOSIS — Z20.828 EXPOSURE TO THE FLU: Primary | ICD-10-CM

## 2025-02-04 DIAGNOSIS — Z33.1 PREGNANCY, INCIDENTAL: ICD-10-CM

## 2025-02-05 RX ORDER — OSELTAMIVIR PHOSPHATE 75 MG/1
75 CAPSULE ORAL DAILY
Qty: 7 CAPSULE | Refills: 0 | Status: SHIPPED | OUTPATIENT
Start: 2025-02-05 | End: 2025-02-05

## 2025-02-06 ENCOUNTER — PRENATAL OFFICE VISIT (OUTPATIENT)
Dept: FAMILY MEDICINE | Facility: CLINIC | Age: 30
End: 2025-02-06
Payer: COMMERCIAL

## 2025-02-06 VITALS
RESPIRATION RATE: 16 BRPM | OXYGEN SATURATION: 98 % | SYSTOLIC BLOOD PRESSURE: 101 MMHG | HEIGHT: 65 IN | WEIGHT: 149 LBS | HEART RATE: 90 BPM | BODY MASS INDEX: 24.83 KG/M2 | DIASTOLIC BLOOD PRESSURE: 67 MMHG

## 2025-02-06 DIAGNOSIS — R09.89 RUNNY NOSE: ICD-10-CM

## 2025-02-06 DIAGNOSIS — Z34.83 ENCOUNTER FOR SUPERVISION OF OTHER NORMAL PREGNANCY IN THIRD TRIMESTER: Primary | ICD-10-CM

## 2025-02-06 LAB
FLUAV RNA SPEC QL NAA+PROBE: NEGATIVE
FLUBV RNA RESP QL NAA+PROBE: NEGATIVE
RSV RNA SPEC NAA+PROBE: NEGATIVE
SARS-COV-2 RNA RESP QL NAA+PROBE: NEGATIVE

## 2025-02-06 NOTE — PROGRESS NOTES
ALANNAH  38w1d      Estimated Date of Delivery: 2025    Chief Complaint   Patient presents with    Prenatal Care     S: Her daughter is sick with fever, congestion since Tuesday. Her daughter's  has + influenza A and RSV exposures there.    Payton has no significant symptoms but does have mild runny nose and PND that started Tuesday as well       Wt Readings from Last 3 Encounters:   25 67.6 kg (149 lb)   25 67.4 kg (148 lb 9.6 oz)   25 67.7 kg (149 lb 3.2 oz)       + fetal movement   No vaginal bleeding, cramping, LOF.   No headaches, vision change, RUQ abdominal pain, LE swelling.     O: Vitals reviewed, see prenatal flowsheet for measurements.  Right sided varicose veins noted at right vulvar area; minimally tender; no palpable masses    A/P:     Encounter for supervision of other normal pregnancy in 3rd trimester  Doing much better this trimester  -B positive, no rhogam needed  - Exp baby BOY!! nips neg, MSAFP neg. Desires Circ in hospital.    - EFW 60%ile @ 20wks  - COVID/Flu shots given   - tdap 2024   - RSV 2025   - 1hr GGT normal; TSH 2.14 on   - SIGNED TOLAC FORM 2025  with her decision for REPEAT CS- sent to scan    Exposure to possible flu/RSV/covid  - swab for these today; sx of runny nose started 2 d ago on Tuesday; she is open to tx if positive to flu/covid     Varicose veins on the right side   reviewed conservative measures  Reviewed indications for imaging if pain, redness, swelling worsens to r/o clot however right now her varicosities appear classic    Exposure to Parvovirus  Exposure at 27weeks gestation. IgG and IgM levels negative on 2024 and All repeat labs were negative.     Hx c section   Planning repeat    - plans repeat  with OB/myself; consult scheduled with Dr Huerta on 2025.   - TOLAC form signed with her decision for repeat , see media tab 25    To review: Planning repeat  as she was told  "after the first one that \"there's no way she would have come out vaginally\"- at 41 weeks 8lb 7oz; pushed for 3hr; had SROM 26hr and Triple I and needed .     History of thyroid disorder  TSH 2., then 2.2024 (above goal <2.5 for fertility) and recently 2.2 on  after initiation of low dose levothyroxine 25mcg daily.   - recheck  normal range at 2.14      Family history of VSD (ventricular septal defect) in father  Normal fetal echo in 2nd trimester      Follow up: weekly until delivery    Anya Palmer MD      Next 5 appointments (look out 90 days)      2025 9:30 AM  (Arrive by 9:20 AM)  Return OB Visit with Anya Palmer MD  [facility] ([facility]) [address] [place] [id number]  [phone]     2025 11:30 AM  (Arrive by 11:20 AM)  Return OB Visit with Anya Palmer MD  [facility] ([facility]) [address] [place] [phone]  [phone]     2025 11:30 AM  (Arrive by 11:20 AM)  Return OB Visit with Anya Palmer MD  [facility] ([facility]) [address] [place] [address]  [phone]     2025 2:00 PM  (Arrive by 1:50 PM)  Return OB Visit with Anya Palmer MD  [facility] ([facility]) [address] [place] [address]  [phone]                  "

## 2025-02-12 ENCOUNTER — TELEPHONE (OUTPATIENT)
Dept: FAMILY MEDICINE | Facility: CLINIC | Age: 30
End: 2025-02-12

## 2025-02-12 ENCOUNTER — PRENATAL OFFICE VISIT (OUTPATIENT)
Dept: FAMILY MEDICINE | Facility: CLINIC | Age: 30
End: 2025-02-12
Payer: COMMERCIAL

## 2025-02-12 VITALS
SYSTOLIC BLOOD PRESSURE: 114 MMHG | RESPIRATION RATE: 16 BRPM | BODY MASS INDEX: 25.18 KG/M2 | WEIGHT: 151.1 LBS | OXYGEN SATURATION: 97 % | HEIGHT: 65 IN | HEART RATE: 110 BPM | DIASTOLIC BLOOD PRESSURE: 74 MMHG

## 2025-02-12 DIAGNOSIS — Z20.828 EXPOSURE TO THE FLU: ICD-10-CM

## 2025-02-12 DIAGNOSIS — I86.3 VULVAR VARICOSE VEINS: ICD-10-CM

## 2025-02-12 DIAGNOSIS — Z98.891 HISTORY OF CESAREAN SECTION: ICD-10-CM

## 2025-02-12 DIAGNOSIS — R09.89 SUSPECTED NOVEL INFLUENZA A VIRUS INFECTION: ICD-10-CM

## 2025-02-12 DIAGNOSIS — Z34.83 ENCOUNTER FOR SUPERVISION OF OTHER NORMAL PREGNANCY IN THIRD TRIMESTER: Primary | ICD-10-CM

## 2025-02-12 PROCEDURE — 99207 PR COMPLICATED OB VISIT: CPT | Performed by: FAMILY MEDICINE

## 2025-02-12 RX ORDER — OSELTAMIVIR PHOSPHATE 75 MG/1
75 CAPSULE ORAL 2 TIMES DAILY
Qty: 10 CAPSULE | Refills: 0 | Status: CANCELLED | OUTPATIENT
Start: 2025-02-12 | End: 2025-02-17

## 2025-02-12 NOTE — TELEPHONE ENCOUNTER
FYI - Status Update    Who is Calling: patient    Update:  was scheduled for 2025 is now moved to 2025, at 12:30PM - [facility] - FYI to PCP    Does caller want a call/response back: Yes     Could we send this information to you in [employer] or would you prefer to receive a phone call?:   Patient would prefer a phone call   Okay to leave a detailed message?: Yes at Home number on file [phone] (home)

## 2025-02-12 NOTE — PROGRESS NOTES
ALANNAH  39w0d    Estimated Date of Delivery: 2025    Chief Complaint   Patient presents with    Prenatal Care     39WK OB-  tested positive for Flu      S: Planned  tomorrow but wanting to reschedule.  Today is Wednesday-  Her  tested positive to Influenza A on Monday (fever/sx started on Friday). Her  is now 24hr fever free but still under the weather.    Payton's Flu test was neg last week when we tested due to her daughter's illness/concerns for possible flu.     Payton is noticing a cough that's worse when laying down but infrequent; it's persisted since her visit last week   Some chest congestion sensation.   NO fevers   +mild runny nose and PND that started Tuesday last week as well       Wt Readings from Last 3 Encounters:   25 68.5 kg (151 lb 1.6 oz)   25 67.6 kg (149 lb)   25 67.4 kg (148 lb 9.6 oz)       + fetal movement   No vaginal bleeding, cramping, LOF.   No headaches, vision change, RUQ abdominal pain, LE swelling.     O: Vitals reviewed, see prenatal flowsheet for measurements.  Right sided varicose veins noted at right vulvar area; minimally tender; no palpable masses    A/P:     Encounter for supervision of other normal pregnancy in 3rd trimester  Doing much better this trimester  -B positive, no rhogam needed  - Exp baby BOY!! nips neg, MSAFP neg. Desires Circ in hospital.    - EFW 60%ile @ 20wks  - COVID/Flu shots given   - tdap 2024   - RSV 2025   - 1hr GGT normal; TSH 2.14 on   - SIGNED TOLAC FORM 2025  with her decision for REPEAT CS- sent to scan      Exposure to Influenza A and suspected mild illness with this x 7 days.   - swab was neg for flu/covid/RSV last week; has now confirmed case in family (; daughter was previously suspected but not tested).   - she declined Tamiflu last week/this as sx mild and was already past 72hr when she brought up the initial concern last week.        Varicose veins on the right  "side   reviewed conservative measures  Reviewed indications for imaging if pain, redness, swelling worsens to r/o clot however right now her varicosities appear classic    Exposure to Parvovirus  Exposure at 27weeks gestation. IgG and IgM levels negative on 2024 and All repeat labs were negative.     Hx c section   Planning repeat  ---> she will update us today if CS is rescheduled to next week.   - plans repeat  with OB/myself; consult scheduled with Dr Huerta on 2025.   - TOLAC form signed with her decision for repeat , see media tab 25    To review: Planning repeat  as she was told after the first one that \"there's no way she would have come out vaginally\"- at 41 weeks 8lb 7oz; pushed for 3hr; had SROM 26hr and Triple I and needed .     History of thyroid disorder  TSH 2., then 2.2024 (above goal <2.5 for fertility) and recently 2.2 on  after initiation of low dose levothyroxine 25mcg daily.   - recheck  normal range at 2.14      Family history of VSD (ventricular septal defect) in father  Normal fetal echo in 2nd trimester      Follow up: weekly until delivery    Anya Palmer MD      Next 5 appointments (look out 90 days)      2025 11:30 AM  (Arrive by 11:20 AM)  Return OB Visit with Anya Palmer MD  [facility] ([facility]) [address] [address]  [phone]     2025 2:00 PM  (Arrive by 1:50 PM)  Return OB Visit with Anya Palmer MD  [facility] ([facility]) [address] [address]  [phone]                "

## 2025-02-14 ENCOUNTER — ANESTHESIA EVENT (OUTPATIENT)
Dept: OBGYN | Facility: CLINIC | Age: 30
End: 2025-02-14
Payer: COMMERCIAL

## 2025-02-15 ENCOUNTER — OFFICE VISIT (OUTPATIENT)
Dept: URGENT CARE | Facility: URGENT CARE | Age: 30
End: 2025-02-15
Payer: COMMERCIAL

## 2025-02-15 VITALS
RESPIRATION RATE: 18 BRPM | WEIGHT: 147 LBS | DIASTOLIC BLOOD PRESSURE: 79 MMHG | SYSTOLIC BLOOD PRESSURE: 121 MMHG | OXYGEN SATURATION: 98 % | BODY MASS INDEX: 24.46 KG/M2 | HEART RATE: 109 BPM | TEMPERATURE: 99 F

## 2025-02-15 DIAGNOSIS — R69 SICK: ICD-10-CM

## 2025-02-15 DIAGNOSIS — J06.9 UPPER RESPIRATORY TRACT INFECTION, UNSPECIFIED TYPE: Primary | ICD-10-CM

## 2025-02-15 LAB
DEPRECATED S PYO AG THROAT QL EIA: NEGATIVE
FLUAV AG SPEC QL IA: NEGATIVE
FLUBV AG SPEC QL IA: NEGATIVE
S PYO DNA THROAT QL NAA+PROBE: NOT DETECTED

## 2025-02-15 PROCEDURE — 87651 STREP A DNA AMP PROBE: CPT | Performed by: PHYSICIAN ASSISTANT

## 2025-02-15 PROCEDURE — 87804 INFLUENZA ASSAY W/OPTIC: CPT

## 2025-02-15 PROCEDURE — 99213 OFFICE O/P EST LOW 20 MIN: CPT | Performed by: PHYSICIAN ASSISTANT

## 2025-02-15 RX ORDER — ACETAMINOPHEN 500 MG
500-1000 TABLET ORAL EVERY 6 HOURS PRN
Status: ON HOLD | COMMUNITY
End: 2025-02-19

## 2025-02-15 NOTE — PROGRESS NOTES
"  Assessment & Plan     Upper respiratory tract infection, unspecified type  Patient presents 39 weeks 3 days gestation. She is negative for flu and Strep. Patient is going to swab for COVID at home. Unclear etiology of her symptoms. I suspect viral URI. Her oxygen is good, lungs clear low suspicion for pneumonia. She is congested so possibility this could turn into a bacterial sinusitis, however, likely viral at this time so will hold on antibiotics. Encouraged supportive care. Pregnancy safe medication discussed. Advised to contact OB if she is positive for COVID for next steps. Warning signs to go to ER. Follow up as needed.    Sick  See plan above  - Streptococcus A Rapid Screen w/Reflex to PCR - Clinic Collect  - Influenza A & B Antigen - Clinic Collect  - Group A Streptococcus PCR Throat Swab          BMI  Estimated body mass index is 24.46 kg/m  as calculated from the following:    Height as of 2/12/25: 1.651 m (5' 5\").    Weight as of this encounter: 66.7 kg (147 lb).             No follow-ups on file.    Gordon Cain is a 29 year old, presenting for the following health issues:  Cough (For a week), Fever (today), and Nausea    HPI       Acute Illness  Acute illness concerns: URI   Onset/Duration: 3-4 days ago   Symptoms:  Fever: YES- 100.3   Chills/Sweats: chills   Headache (location?): No  Sinus Pressure: No  Conjunctivitis:  No  Ear Pain: no  Rhinorrhea: YES  Congestion: YES  Sore Throat: YES- only at night   Cough: YES - worse at night   Wheeze: No  Decreased Appetite: No  Nausea: YES  Vomiting: No  Diarrhea: No - more loose than normal  Dysuria/Freq.: No  Dysuria or Hematuria: No  Fatigue/Achiness: YES  Sick/Strep Exposure: YES- flu in the family  Therapies tried and outcome: tylenol last night and this morning        Review of Systems  Constitutional, HEENT, cardiovascular, pulmonary, gi and gu systems are negative, except as otherwise noted.      Objective    /79   Pulse 109   Temp 99  F " (37.2  C)   Resp 18   Wt 66.7 kg (147 lb)   LMP 05/15/2024 (Exact Date)   SpO2 98%   BMI 24.46 kg/m    Body mass index is 24.46 kg/m .  Physical Exam  Constitutional:       Appearance: Normal appearance.   HENT:      Right Ear: Tympanic membrane and ear canal normal.      Left Ear: Tympanic membrane and ear canal normal.      Nose: Congestion and rhinorrhea present.      Right Sinus: No maxillary sinus tenderness or frontal sinus tenderness.      Left Sinus: No maxillary sinus tenderness or frontal sinus tenderness.      Mouth/Throat:      Mouth: Mucous membranes are moist.      Pharynx: Oropharynx is clear. No posterior oropharyngeal erythema.      Comments: Postnasal drainage  Cardiovascular:      Rate and Rhythm: Normal rate and regular rhythm.   Pulmonary:      Effort: Pulmonary effort is normal.      Breath sounds: Normal breath sounds.   Lymphadenopathy:      Cervical: No cervical adenopathy.   Neurological:      Mental Status: She is alert.   Psychiatric:         Mood and Affect: Mood normal.         Behavior: Behavior normal.            Results for orders placed or performed in visit on 02/15/25   Streptococcus A Rapid Screen w/Reflex to PCR - Clinic Collect     Status: Normal    Specimen: Throat; Swab   Result Value Ref Range    Group A Strep antigen Negative Negative   Influenza A & B Antigen - Clinic Collect     Status: Normal    Specimen: Nose; Swab   Result Value Ref Range    Influenza A antigen Negative Negative    Influenza B antigen Negative Negative    Narrative    Test results must be correlated with clinical data. If necessary, results should be confirmed by a molecular assay or viral culture.           Signed Electronically by: PRITI Garcia

## 2025-02-17 ENCOUNTER — ANESTHESIA (OUTPATIENT)
Dept: OBGYN | Facility: CLINIC | Age: 30
End: 2025-02-17
Payer: COMMERCIAL

## 2025-02-17 ENCOUNTER — HOSPITAL ENCOUNTER (INPATIENT)
Facility: CLINIC | Age: 30
LOS: 2 days | Discharge: HOME | End: 2025-02-19
Attending: OBSTETRICS & GYNECOLOGY | Admitting: FAMILY MEDICINE
Payer: COMMERCIAL

## 2025-02-17 DIAGNOSIS — O34.29 UTERINE SCAR FROM PREVIOUS SURGERY AFFECTING PREGNANCY: ICD-10-CM

## 2025-02-17 DIAGNOSIS — Z98.891 S/P CESAREAN SECTION: Primary | ICD-10-CM

## 2025-02-17 PROBLEM — O33.9 CPD (CEPHALO-PELVIC DISPROPORTION): Status: ACTIVE | Noted: 2025-02-17

## 2025-02-17 PROBLEM — Z36.89 ENCOUNTER FOR TRIAGE IN PREGNANT PATIENT: Status: RESOLVED | Noted: 2025-01-28 | Resolved: 2025-02-17

## 2025-02-17 PROBLEM — E03.8 SUBCLINICAL HYPOTHYROIDISM: Status: ACTIVE | Noted: 2022-01-26

## 2025-02-17 LAB
ABO + RH BLD: NORMAL
BLD GP AB SCN SERPL QL: NEGATIVE
ERYTHROCYTE [DISTWIDTH] IN BLOOD BY AUTOMATED COUNT: 12.6 % (ref 10–15)
HCT VFR BLD AUTO: 33 % (ref 35–47)
HGB BLD-MCNC: 11.2 G/DL (ref 11.7–15.7)
MCH RBC QN AUTO: 28.3 PG (ref 26.5–33)
MCHC RBC AUTO-ENTMCNC: 33.9 G/DL (ref 31.5–36.5)
MCV RBC AUTO: 83 FL (ref 78–100)
PLATELET # BLD AUTO: 158 10E3/UL (ref 150–450)
RBC # BLD AUTO: 3.96 10E6/UL (ref 3.8–5.2)
SPECIMEN EXP DATE BLD: NORMAL
T PALLIDUM AB SER QL: NONREACTIVE
WBC # BLD AUTO: 7.4 10E3/UL (ref 4–11)

## 2025-02-17 PROCEDURE — 272N000001 HC OR GENERAL SUPPLY STERILE: Performed by: OBSTETRICS & GYNECOLOGY

## 2025-02-17 PROCEDURE — 86780 TREPONEMA PALLIDUM: CPT | Performed by: NURSE PRACTITIONER

## 2025-02-17 PROCEDURE — 250N000011 HC RX IP 250 OP 636: Performed by: STUDENT IN AN ORGANIZED HEALTH CARE EDUCATION/TRAINING PROGRAM

## 2025-02-17 PROCEDURE — 250N000009 HC RX 250: Performed by: NURSE ANESTHETIST, CERTIFIED REGISTERED

## 2025-02-17 PROCEDURE — 258N000003 HC RX IP 258 OP 636: Performed by: STUDENT IN AN ORGANIZED HEALTH CARE EDUCATION/TRAINING PROGRAM

## 2025-02-17 PROCEDURE — 250N000011 HC RX IP 250 OP 636: Performed by: NURSE PRACTITIONER

## 2025-02-17 PROCEDURE — 59514 CESAREAN DELIVERY ONLY: CPT | Mod: 80 | Performed by: FAMILY MEDICINE

## 2025-02-17 PROCEDURE — 370N000017 HC ANESTHESIA TECHNICAL FEE, PER MIN: Performed by: OBSTETRICS & GYNECOLOGY

## 2025-02-17 PROCEDURE — 999N000249 HC STATISTIC C-SECTION ON UNIT

## 2025-02-17 PROCEDURE — 360N000076 HC SURGERY LEVEL 3, PER MIN: Performed by: OBSTETRICS & GYNECOLOGY

## 2025-02-17 PROCEDURE — 250N000011 HC RX IP 250 OP 636: Performed by: NURSE ANESTHETIST, CERTIFIED REGISTERED

## 2025-02-17 PROCEDURE — 36415 COLL VENOUS BLD VENIPUNCTURE: CPT | Performed by: NURSE PRACTITIONER

## 2025-02-17 PROCEDURE — 99221 1ST HOSP IP/OBS SF/LOW 40: CPT | Performed by: FAMILY MEDICINE

## 2025-02-17 PROCEDURE — 85027 COMPLETE CBC AUTOMATED: CPT | Performed by: NURSE PRACTITIONER

## 2025-02-17 PROCEDURE — 250N000011 HC RX IP 250 OP 636: Performed by: OBSTETRICS & GYNECOLOGY

## 2025-02-17 PROCEDURE — 86900 BLOOD TYPING SEROLOGIC ABO: CPT | Performed by: NURSE PRACTITIONER

## 2025-02-17 PROCEDURE — 250N000013 HC RX MED GY IP 250 OP 250 PS 637: Performed by: NURSE PRACTITIONER

## 2025-02-17 PROCEDURE — 120N000001 HC R&B MED SURG/OB

## 2025-02-17 PROCEDURE — 258N000003 HC RX IP 258 OP 636: Performed by: NURSE ANESTHETIST, CERTIFIED REGISTERED

## 2025-02-17 PROCEDURE — 250N000013 HC RX MED GY IP 250 OP 250 PS 637: Performed by: OBSTETRICS & GYNECOLOGY

## 2025-02-17 PROCEDURE — 710N000010 HC RECOVERY PHASE 1, LEVEL 2, PER MIN: Performed by: OBSTETRICS & GYNECOLOGY

## 2025-02-17 RX ORDER — LIDOCAINE 40 MG/G
CREAM TOPICAL
Status: DISCONTINUED | OUTPATIENT
Start: 2025-02-17 | End: 2025-02-17 | Stop reason: HOSPADM

## 2025-02-17 RX ORDER — BUPIVACAINE HYDROCHLORIDE 2.5 MG/ML
INJECTION, SOLUTION EPIDURAL; INFILTRATION; INTRACAUDAL
Status: COMPLETED | OUTPATIENT
Start: 2025-02-17 | End: 2025-02-17

## 2025-02-17 RX ORDER — ONDANSETRON 4 MG/1
4 TABLET, ORALLY DISINTEGRATING ORAL EVERY 6 HOURS PRN
Status: DISCONTINUED | OUTPATIENT
Start: 2025-02-17 | End: 2025-02-19 | Stop reason: HOSPADM

## 2025-02-17 RX ORDER — IBUPROFEN 800 MG/1
800 TABLET, FILM COATED ORAL EVERY 6 HOURS
Status: DISCONTINUED | OUTPATIENT
Start: 2025-02-17 | End: 2025-02-17

## 2025-02-17 RX ORDER — ACETAMINOPHEN 325 MG/1
975 TABLET ORAL ONCE
Status: COMPLETED | OUTPATIENT
Start: 2025-02-17 | End: 2025-02-17

## 2025-02-17 RX ORDER — TRANEXAMIC ACID 10 MG/ML
1 INJECTION, SOLUTION INTRAVENOUS EVERY 30 MIN PRN
Status: DISCONTINUED | OUTPATIENT
Start: 2025-02-17 | End: 2025-02-19 | Stop reason: HOSPADM

## 2025-02-17 RX ORDER — IBUPROFEN 800 MG/1
800 TABLET, FILM COATED ORAL EVERY 6 HOURS
Status: DISCONTINUED | OUTPATIENT
Start: 2025-02-18 | End: 2025-02-18

## 2025-02-17 RX ORDER — TRANEXAMIC ACID 10 MG/ML
1 INJECTION, SOLUTION INTRAVENOUS EVERY 30 MIN PRN
Status: DISCONTINUED | OUTPATIENT
Start: 2025-02-17 | End: 2025-02-17 | Stop reason: HOSPADM

## 2025-02-17 RX ORDER — OXYTOCIN 10 [USP'U]/ML
10 INJECTION, SOLUTION INTRAMUSCULAR; INTRAVENOUS
Status: DISCONTINUED | OUTPATIENT
Start: 2025-02-17 | End: 2025-02-19 | Stop reason: HOSPADM

## 2025-02-17 RX ORDER — LOPERAMIDE HYDROCHLORIDE 2 MG/1
4 CAPSULE ORAL
Status: DISCONTINUED | OUTPATIENT
Start: 2025-02-17 | End: 2025-02-17 | Stop reason: HOSPADM

## 2025-02-17 RX ORDER — NALOXONE HYDROCHLORIDE 0.4 MG/ML
0.2 INJECTION, SOLUTION INTRAMUSCULAR; INTRAVENOUS; SUBCUTANEOUS
Status: DISCONTINUED | OUTPATIENT
Start: 2025-02-17 | End: 2025-02-17

## 2025-02-17 RX ORDER — METHYLERGONOVINE MALEATE 0.2 MG/ML
200 INJECTION INTRAVENOUS
Status: DISCONTINUED | OUTPATIENT
Start: 2025-02-17 | End: 2025-02-19 | Stop reason: HOSPADM

## 2025-02-17 RX ORDER — HYDROMORPHONE HCL IN WATER/PF 6 MG/30 ML
0.2 PATIENT CONTROLLED ANALGESIA SYRINGE INTRAVENOUS EVERY 5 MIN PRN
Status: DISCONTINUED | OUTPATIENT
Start: 2025-02-17 | End: 2025-02-17

## 2025-02-17 RX ORDER — NALOXONE HYDROCHLORIDE 0.4 MG/ML
0.4 INJECTION, SOLUTION INTRAMUSCULAR; INTRAVENOUS; SUBCUTANEOUS
Status: DISCONTINUED | OUTPATIENT
Start: 2025-02-17 | End: 2025-02-17

## 2025-02-17 RX ORDER — DEXTROSE, SODIUM CHLORIDE, SODIUM LACTATE, POTASSIUM CHLORIDE, AND CALCIUM CHLORIDE 5; .6; .31; .03; .02 G/100ML; G/100ML; G/100ML; G/100ML; G/100ML
INJECTION, SOLUTION INTRAVENOUS CONTINUOUS
Status: DISCONTINUED | OUTPATIENT
Start: 2025-02-17 | End: 2025-02-19 | Stop reason: HOSPADM

## 2025-02-17 RX ORDER — HYDROMORPHONE HCL IN WATER/PF 6 MG/30 ML
0.4 PATIENT CONTROLLED ANALGESIA SYRINGE INTRAVENOUS EVERY 5 MIN PRN
Status: DISCONTINUED | OUTPATIENT
Start: 2025-02-17 | End: 2025-02-17

## 2025-02-17 RX ORDER — KETOROLAC TROMETHAMINE 15 MG/ML
15 INJECTION, SOLUTION INTRAMUSCULAR; INTRAVENOUS EVERY 6 HOURS
Status: COMPLETED | OUTPATIENT
Start: 2025-02-17 | End: 2025-02-18

## 2025-02-17 RX ORDER — AMOXICILLIN 250 MG
2 CAPSULE ORAL 2 TIMES DAILY
Status: DISCONTINUED | OUTPATIENT
Start: 2025-02-17 | End: 2025-02-19 | Stop reason: HOSPADM

## 2025-02-17 RX ORDER — OXYTOCIN/0.9 % SODIUM CHLORIDE 30/500 ML
340 PLASTIC BAG, INJECTION (ML) INTRAVENOUS CONTINUOUS PRN
Status: DISCONTINUED | OUTPATIENT
Start: 2025-02-17 | End: 2025-02-19 | Stop reason: HOSPADM

## 2025-02-17 RX ORDER — LIDOCAINE 40 MG/G
CREAM TOPICAL
Status: DISCONTINUED | OUTPATIENT
Start: 2025-02-17 | End: 2025-02-19 | Stop reason: HOSPADM

## 2025-02-17 RX ORDER — MISOPROSTOL 200 UG/1
400 TABLET ORAL
Status: DISCONTINUED | OUTPATIENT
Start: 2025-02-17 | End: 2025-02-17 | Stop reason: HOSPADM

## 2025-02-17 RX ORDER — ACETAMINOPHEN 325 MG/1
975 TABLET ORAL EVERY 6 HOURS
Status: DISCONTINUED | OUTPATIENT
Start: 2025-02-17 | End: 2025-02-19 | Stop reason: HOSPADM

## 2025-02-17 RX ORDER — SODIUM CHLORIDE, SODIUM LACTATE, POTASSIUM CHLORIDE, CALCIUM CHLORIDE 600; 310; 30; 20 MG/100ML; MG/100ML; MG/100ML; MG/100ML
INJECTION, SOLUTION INTRAVENOUS CONTINUOUS
Status: DISCONTINUED | OUTPATIENT
Start: 2025-02-17 | End: 2025-02-17 | Stop reason: HOSPADM

## 2025-02-17 RX ORDER — MISOPROSTOL 200 UG/1
800 TABLET ORAL
Status: DISCONTINUED | OUTPATIENT
Start: 2025-02-17 | End: 2025-02-19 | Stop reason: HOSPADM

## 2025-02-17 RX ORDER — KETOROLAC TROMETHAMINE 30 MG/ML
INJECTION, SOLUTION INTRAMUSCULAR; INTRAVENOUS PRN
Status: DISCONTINUED | OUTPATIENT
Start: 2025-02-17 | End: 2025-02-17

## 2025-02-17 RX ORDER — PROCHLORPERAZINE MALEATE 10 MG
10 TABLET ORAL EVERY 6 HOURS PRN
Status: DISCONTINUED | OUTPATIENT
Start: 2025-02-17 | End: 2025-02-19 | Stop reason: HOSPADM

## 2025-02-17 RX ORDER — DEXAMETHASONE SODIUM PHOSPHATE 4 MG/ML
INJECTION, SOLUTION INTRA-ARTICULAR; INTRALESIONAL; INTRAMUSCULAR; INTRAVENOUS; SOFT TISSUE PRN
Status: DISCONTINUED | OUTPATIENT
Start: 2025-02-17 | End: 2025-02-17

## 2025-02-17 RX ORDER — OXYTOCIN/0.9 % SODIUM CHLORIDE 30/500 ML
100-340 PLASTIC BAG, INJECTION (ML) INTRAVENOUS CONTINUOUS PRN
Status: DISCONTINUED | OUTPATIENT
Start: 2025-02-17 | End: 2025-02-17

## 2025-02-17 RX ORDER — SODIUM CHLORIDE, SODIUM LACTATE, POTASSIUM CHLORIDE, CALCIUM CHLORIDE 600; 310; 30; 20 MG/100ML; MG/100ML; MG/100ML; MG/100ML
INJECTION, SOLUTION INTRAVENOUS CONTINUOUS
Status: DISCONTINUED | OUTPATIENT
Start: 2025-02-17 | End: 2025-02-17

## 2025-02-17 RX ORDER — OXYTOCIN 10 [USP'U]/ML
10 INJECTION, SOLUTION INTRAMUSCULAR; INTRAVENOUS
Status: DISCONTINUED | OUTPATIENT
Start: 2025-02-17 | End: 2025-02-17

## 2025-02-17 RX ORDER — MISOPROSTOL 200 UG/1
800 TABLET ORAL
Status: DISCONTINUED | OUTPATIENT
Start: 2025-02-17 | End: 2025-02-17 | Stop reason: HOSPADM

## 2025-02-17 RX ORDER — SIMETHICONE 80 MG
80 TABLET,CHEWABLE ORAL 4 TIMES DAILY PRN
Status: DISCONTINUED | OUTPATIENT
Start: 2025-02-17 | End: 2025-02-19 | Stop reason: HOSPADM

## 2025-02-17 RX ORDER — MISOPROSTOL 200 UG/1
400 TABLET ORAL
Status: DISCONTINUED | OUTPATIENT
Start: 2025-02-17 | End: 2025-02-19 | Stop reason: HOSPADM

## 2025-02-17 RX ORDER — AMOXICILLIN 250 MG
1 CAPSULE ORAL 2 TIMES DAILY
Status: DISCONTINUED | OUTPATIENT
Start: 2025-02-17 | End: 2025-02-19 | Stop reason: HOSPADM

## 2025-02-17 RX ORDER — MORPHINE SULFATE 1 MG/ML
INJECTION, SOLUTION EPIDURAL; INTRATHECAL; INTRAVENOUS
Status: DISCONTINUED | OUTPATIENT
Start: 2025-02-17 | End: 2025-02-17

## 2025-02-17 RX ORDER — CEFAZOLIN SODIUM/WATER 2 G/20 ML
2 SYRINGE (ML) INTRAVENOUS SEE ADMIN INSTRUCTIONS
Status: DISCONTINUED | OUTPATIENT
Start: 2025-02-17 | End: 2025-02-17 | Stop reason: HOSPADM

## 2025-02-17 RX ORDER — LEVOTHYROXINE SODIUM 25 UG/1
25 TABLET ORAL DAILY
Status: DISCONTINUED | OUTPATIENT
Start: 2025-02-17 | End: 2025-02-17

## 2025-02-17 RX ORDER — LOPERAMIDE HYDROCHLORIDE 2 MG/1
4 CAPSULE ORAL
Status: DISCONTINUED | OUTPATIENT
Start: 2025-02-17 | End: 2025-02-19 | Stop reason: HOSPADM

## 2025-02-17 RX ORDER — MORPHINE SULFATE 0.5 MG/ML
150 INJECTION, SOLUTION EPIDURAL; INTRATHECAL; INTRAVENOUS ONCE
Status: DISCONTINUED | OUTPATIENT
Start: 2025-02-17 | End: 2025-02-17 | Stop reason: HOSPADM

## 2025-02-17 RX ORDER — OXYTOCIN/0.9 % SODIUM CHLORIDE 30/500 ML
340 PLASTIC BAG, INJECTION (ML) INTRAVENOUS CONTINUOUS PRN
Status: DISCONTINUED | OUTPATIENT
Start: 2025-02-17 | End: 2025-02-17 | Stop reason: HOSPADM

## 2025-02-17 RX ORDER — ONDANSETRON 4 MG/1
4 TABLET, ORALLY DISINTEGRATING ORAL EVERY 30 MIN PRN
Status: DISCONTINUED | OUTPATIENT
Start: 2025-02-17 | End: 2025-02-17

## 2025-02-17 RX ORDER — FENTANYL CITRATE 50 UG/ML
INJECTION, SOLUTION INTRAMUSCULAR; INTRAVENOUS
Status: DISCONTINUED | OUTPATIENT
Start: 2025-02-17 | End: 2025-02-17

## 2025-02-17 RX ORDER — NALBUPHINE HYDROCHLORIDE 10 MG/ML
2.5-5 INJECTION INTRAMUSCULAR; INTRAVENOUS; SUBCUTANEOUS EVERY 6 HOURS PRN
Status: DISCONTINUED | OUTPATIENT
Start: 2025-02-17 | End: 2025-02-17

## 2025-02-17 RX ORDER — OXYCODONE HYDROCHLORIDE 5 MG/1
5 TABLET ORAL EVERY 4 HOURS PRN
Status: DISCONTINUED | OUTPATIENT
Start: 2025-02-17 | End: 2025-02-19 | Stop reason: HOSPADM

## 2025-02-17 RX ORDER — CEFAZOLIN SODIUM/WATER 2 G/20 ML
SYRINGE (ML) INTRAVENOUS
Status: DISCONTINUED
Start: 2025-02-17 | End: 2025-02-17 | Stop reason: HOSPADM

## 2025-02-17 RX ORDER — METOCLOPRAMIDE HYDROCHLORIDE 5 MG/ML
10 INJECTION INTRAMUSCULAR; INTRAVENOUS EVERY 6 HOURS PRN
Status: DISCONTINUED | OUTPATIENT
Start: 2025-02-17 | End: 2025-02-19 | Stop reason: HOSPADM

## 2025-02-17 RX ORDER — NALOXONE HYDROCHLORIDE 0.4 MG/ML
0.1 INJECTION, SOLUTION INTRAMUSCULAR; INTRAVENOUS; SUBCUTANEOUS
Status: DISCONTINUED | OUTPATIENT
Start: 2025-02-17 | End: 2025-02-17

## 2025-02-17 RX ORDER — CARBOPROST TROMETHAMINE 250 UG/ML
250 INJECTION, SOLUTION INTRAMUSCULAR
Status: DISCONTINUED | OUTPATIENT
Start: 2025-02-17 | End: 2025-02-17 | Stop reason: HOSPADM

## 2025-02-17 RX ORDER — CEFAZOLIN SODIUM/WATER 2 G/20 ML
2 SYRINGE (ML) INTRAVENOUS
Status: COMPLETED | OUTPATIENT
Start: 2025-02-17 | End: 2025-02-17

## 2025-02-17 RX ORDER — ONDANSETRON 2 MG/ML
4 INJECTION INTRAMUSCULAR; INTRAVENOUS EVERY 4 HOURS PRN
Status: DISCONTINUED | OUTPATIENT
Start: 2025-02-17 | End: 2025-02-17

## 2025-02-17 RX ORDER — LOPERAMIDE HYDROCHLORIDE 2 MG/1
2 CAPSULE ORAL
Status: DISCONTINUED | OUTPATIENT
Start: 2025-02-17 | End: 2025-02-19 | Stop reason: HOSPADM

## 2025-02-17 RX ORDER — SODIUM PHOSPHATE,MONO-DIBASIC 19G-7G/118
1 ENEMA (ML) RECTAL DAILY PRN
Status: DISCONTINUED | OUTPATIENT
Start: 2025-02-19 | End: 2025-02-19 | Stop reason: HOSPADM

## 2025-02-17 RX ORDER — BUPIVACAINE HYDROCHLORIDE 7.5 MG/ML
INJECTION, SOLUTION INTRASPINAL
Status: DISCONTINUED | OUTPATIENT
Start: 2025-02-17 | End: 2025-02-17

## 2025-02-17 RX ORDER — ONDANSETRON 2 MG/ML
4 INJECTION INTRAMUSCULAR; INTRAVENOUS EVERY 6 HOURS PRN
Status: DISCONTINUED | OUTPATIENT
Start: 2025-02-17 | End: 2025-02-19 | Stop reason: HOSPADM

## 2025-02-17 RX ORDER — METOCLOPRAMIDE 10 MG/1
10 TABLET ORAL EVERY 6 HOURS PRN
Status: DISCONTINUED | OUTPATIENT
Start: 2025-02-17 | End: 2025-02-19 | Stop reason: HOSPADM

## 2025-02-17 RX ORDER — OXYTOCIN/0.9 % SODIUM CHLORIDE 30/500 ML
PLASTIC BAG, INJECTION (ML) INTRAVENOUS CONTINUOUS PRN
Status: DISCONTINUED | OUTPATIENT
Start: 2025-02-17 | End: 2025-02-17

## 2025-02-17 RX ORDER — CARBOPROST TROMETHAMINE 250 UG/ML
250 INJECTION, SOLUTION INTRAMUSCULAR
Status: DISCONTINUED | OUTPATIENT
Start: 2025-02-17 | End: 2025-02-19 | Stop reason: HOSPADM

## 2025-02-17 RX ORDER — BISACODYL 10 MG
10 SUPPOSITORY, RECTAL RECTAL DAILY PRN
Status: DISCONTINUED | OUTPATIENT
Start: 2025-02-19 | End: 2025-02-19 | Stop reason: HOSPADM

## 2025-02-17 RX ORDER — ONDANSETRON 2 MG/ML
INJECTION INTRAMUSCULAR; INTRAVENOUS PRN
Status: DISCONTINUED | OUTPATIENT
Start: 2025-02-17 | End: 2025-02-17

## 2025-02-17 RX ORDER — METHYLERGONOVINE MALEATE 0.2 MG/ML
200 INJECTION INTRAVENOUS
Status: DISCONTINUED | OUTPATIENT
Start: 2025-02-17 | End: 2025-02-17 | Stop reason: HOSPADM

## 2025-02-17 RX ORDER — ONDANSETRON 2 MG/ML
4 INJECTION INTRAMUSCULAR; INTRAVENOUS EVERY 30 MIN PRN
Status: DISCONTINUED | OUTPATIENT
Start: 2025-02-17 | End: 2025-02-17

## 2025-02-17 RX ORDER — HYDROCORTISONE 25 MG/G
CREAM TOPICAL 3 TIMES DAILY PRN
Status: DISCONTINUED | OUTPATIENT
Start: 2025-02-17 | End: 2025-02-19 | Stop reason: HOSPADM

## 2025-02-17 RX ORDER — DEXAMETHASONE SODIUM PHOSPHATE 4 MG/ML
4 INJECTION, SOLUTION INTRA-ARTICULAR; INTRALESIONAL; INTRAMUSCULAR; INTRAVENOUS; SOFT TISSUE
Status: DISCONTINUED | OUTPATIENT
Start: 2025-02-17 | End: 2025-02-17

## 2025-02-17 RX ORDER — LOPERAMIDE HYDROCHLORIDE 2 MG/1
2 CAPSULE ORAL
Status: DISCONTINUED | OUTPATIENT
Start: 2025-02-17 | End: 2025-02-17 | Stop reason: HOSPADM

## 2025-02-17 RX ORDER — CITRIC ACID/SODIUM CITRATE 334-500MG
30 SOLUTION, ORAL ORAL
Status: COMPLETED | OUTPATIENT
Start: 2025-02-17 | End: 2025-02-17

## 2025-02-17 RX ORDER — OXYTOCIN 10 [USP'U]/ML
10 INJECTION, SOLUTION INTRAMUSCULAR; INTRAVENOUS
Status: DISCONTINUED | OUTPATIENT
Start: 2025-02-17 | End: 2025-02-17 | Stop reason: HOSPADM

## 2025-02-17 RX ADMIN — KETOROLAC TROMETHAMINE 15 MG: 30 INJECTION, SOLUTION INTRAMUSCULAR at 14:00

## 2025-02-17 RX ADMIN — FENTANYL CITRATE 15 MCG: 50 INJECTION INTRAMUSCULAR; INTRAVENOUS at 13:07

## 2025-02-17 RX ADMIN — BUPIVACAINE HYDROCHLORIDE 10 ML: 2.5 INJECTION, SOLUTION EPIDURAL; INFILTRATION; INTRACAUDAL at 13:46

## 2025-02-17 RX ADMIN — BUPIVACAINE HYDROCHLORIDE 10 ML: 2.5 INJECTION, SOLUTION EPIDURAL; INFILTRATION; INTRACAUDAL at 13:48

## 2025-02-17 RX ADMIN — KETOROLAC TROMETHAMINE 15 MG: 15 INJECTION, SOLUTION INTRAMUSCULAR; INTRAVENOUS at 21:54

## 2025-02-17 RX ADMIN — Medication 0.15 MG: at 13:07

## 2025-02-17 RX ADMIN — DEXAMETHASONE SODIUM PHOSPHATE 4 MG: 4 INJECTION, SOLUTION INTRA-ARTICULAR; INTRALESIONAL; INTRAMUSCULAR; INTRAVENOUS; SOFT TISSUE at 13:15

## 2025-02-17 RX ADMIN — SIMETHICONE 80 MG: 80 TABLET, CHEWABLE ORAL at 21:54

## 2025-02-17 RX ADMIN — SENNOSIDES AND DOCUSATE SODIUM 2 TABLET: 50; 8.6 TABLET ORAL at 21:54

## 2025-02-17 RX ADMIN — SODIUM CHLORIDE, POTASSIUM CHLORIDE, SODIUM LACTATE AND CALCIUM CHLORIDE 100 ML: 600; 310; 30; 20 INJECTION, SOLUTION INTRAVENOUS at 12:20

## 2025-02-17 RX ADMIN — SODIUM CHLORIDE, POTASSIUM CHLORIDE, SODIUM LACTATE AND CALCIUM CHLORIDE: 600; 310; 30; 20 INJECTION, SOLUTION INTRAVENOUS at 11:30

## 2025-02-17 RX ADMIN — ACETAMINOPHEN 975 MG: 325 TABLET ORAL at 18:10

## 2025-02-17 RX ADMIN — BUPIVACAINE HYDROCHLORIDE IN DEXTROSE 1.6 ML: 7.5 INJECTION, SOLUTION SUBARACHNOID at 13:07

## 2025-02-17 RX ADMIN — ACETAMINOPHEN 975 MG: 325 TABLET ORAL at 12:11

## 2025-02-17 RX ADMIN — BUPIVACAINE 10 ML: 13.3 INJECTION, SUSPENSION, LIPOSOMAL INFILTRATION at 13:49

## 2025-02-17 RX ADMIN — BUPIVACAINE 10 ML: 13.3 INJECTION, SUSPENSION, LIPOSOMAL INFILTRATION at 13:47

## 2025-02-17 RX ADMIN — PHENYLEPHRINE HYDROCHLORIDE 0.2 MCG/KG/MIN: 10 INJECTION INTRAVENOUS at 13:05

## 2025-02-17 RX ADMIN — ONDANSETRON 4 MG: 2 INJECTION INTRAMUSCULAR; INTRAVENOUS at 13:05

## 2025-02-17 RX ADMIN — Medication 600 ML/HR: at 13:26

## 2025-02-17 RX ADMIN — SODIUM CITRATE AND CITRIC ACID MONOHYDRATE 30 ML: 500; 334 SOLUTION ORAL at 12:10

## 2025-02-17 RX ADMIN — Medication 2 G: at 12:55

## 2025-02-17 RX ADMIN — SODIUM CHLORIDE, POTASSIUM CHLORIDE, SODIUM LACTATE AND CALCIUM CHLORIDE: 600; 310; 30; 20 INJECTION, SOLUTION INTRAVENOUS at 12:50

## 2025-02-17 ASSESSMENT — ACTIVITIES OF DAILY LIVING (ADL)
FALL_HISTORY_WITHIN_LAST_SIX_MONTHS: NO
ADLS_ACUITY_SCORE: 16
WALKING_OR_CLIMBING_STAIRS_DIFFICULTY: NO
ADLS_ACUITY_SCORE: 15
ADLS_ACUITY_SCORE: 20
ADLS_ACUITY_SCORE: 16
ADLS_ACUITY_SCORE: 16
ADLS_ACUITY_SCORE: 15
DOING_ERRANDS_INDEPENDENTLY_DIFFICULTY: NO
CONCENTRATING,_REMEMBERING_OR_MAKING_DECISIONS_DIFFICULTY: NO
DIFFICULTY_EATING/SWALLOWING: NO
WEAR_GLASSES_OR_BLIND: NO
ADLS_ACUITY_SCORE: 15
DIFFICULTY_COMMUNICATING: NO
ADLS_ACUITY_SCORE: 15
ADLS_ACUITY_SCORE: 15
HEARING_DIFFICULTY_OR_DEAF: NO
CHANGE_IN_FUNCTIONAL_STATUS_SINCE_ONSET_OF_CURRENT_ILLNESS/INJURY: NO
ADLS_ACUITY_SCORE: 15
ADLS_ACUITY_SCORE: 16
DRESSING/BATHING_DIFFICULTY: NO
ADLS_ACUITY_SCORE: 15
TOILETING_ISSUES: NO
ADLS_ACUITY_SCORE: 15

## 2025-02-17 NOTE — ANESTHESIA POSTPROCEDURE EVALUATION
Patient: Meghann Burt    Procedure: Procedure(s):  REPEAT  SECTION       Anesthesia Type:  Spinal    Note:  Disposition: Inpatient   Postop Pain Control: Uneventful            Sign Out: Well controlled pain   PONV: No   Neuro/Psych: Uneventful            Sign Out: Acceptable/Baseline neuro status   Airway/Respiratory: Uneventful            Sign Out: Acceptable/Baseline resp. status   CV/Hemodynamics: Uneventful            Sign Out: Acceptable CV status; No obvious hypovolemia; No obvious fluid overload   Other NRE: NONE   DID A NON-ROUTINE EVENT OCCUR?            Last vitals:  Vitals:    25 1538 25 1553 25 1607   BP: 92/52 95/53 96/50   Pulse: 58 58 71   Resp: 16 15 16   Temp:      SpO2: 99% 99% 98%       Electronically Signed By: Allison Coon MD  2025  4:11 PM

## 2025-02-17 NOTE — ANESTHESIA PROCEDURE NOTES
TAP Procedure Note    Pre-Procedure   Staff -        Anesthesiologist:  Adri Franklin MD       Performed By: anesthesiologist       Location: OR       Procedure Start/Stop Times: 2/17/2025 1:45 PM and 2/17/2025 1:50 PM       Pre-Anesthestic Checklist: patient identified, IV checked, site marked, risks and benefits discussed, informed consent, monitors and equipment checked, pre-op evaluation, at physician/surgeon's request and post-op pain management  Timeout:       Correct Patient: Yes        Correct Procedure: Yes        Correct Site: Yes        Correct Position: Yes        Correct Laterality: Yes        Site Marked: Yes  Procedure Documentation  Procedure: TAP         Laterality: bilateral       Patient Position: supine       Patient Prep/Sterile Barriers: sterile gloves, mask       Skin prep: Chloraprep       Needle type: Stimuplex.       Needle Gauge: 21.        Needle Length (Inches): 4        Ultrasound guided       1. Ultrasound was used to identify targeted nerve, plexus, vascular marker, or fascial plane and place a needle adjacent to it in real-time.       2. Ultrasound was used to visualize the spread of anesthetic in close proximity to the above referenced structure.       3. A permanent image is entered into the patient's record.       4. The visualized anatomic structures appeared normal.       5. There were no apparent abnormal pathologic findings.    Assessment/Narrative         The placement was negative for: blood aspirated, painful injection (spinal anesthetic) and site bleeding       Paresthesias: No.       Bolus given via needle. no blood aspirated via catheter.        Secured via.        Insertion/Infusion Method: Single Shot       Complications: none       Injection made incrementally with aspirations every 5 mL.    Medication(s) Administered   Bupivacaine 0.25% PF (Infiltration) - Infiltration   10 mL - 2/17/2025 1:46:00 PM   10 mL - 2/17/2025 1:48:00 PM  Bupivacaine liposome  "(Exparel) 1.3% LA inj susp (Infiltration) - Infiltration   10 mL - 2/17/2025 1:47:00 PM   10 mL - 2/17/2025 1:49:00 PM  Medication Administration Time: 2/17/2025 1:45 PM      FOR [facility] ([facility]) ONLY:   Pain Team Contact information: please page the Pain Team Via [employer]. Search \"Pain\". During daytime hours, please page the attending first. At night please page the resident first.      "

## 2025-02-17 NOTE — ANESTHESIA PROCEDURE NOTES
"Intrathecal injection Procedure Note    Pre-Procedure   Staff -        Anesthesiologist:  Allison Coon MD       Performed By: anesthesiologist       Location: OB       Procedure Start/Stop Times: 2/17/2025 1:07 PM and 2/17/2025 1:08 PM       Pre-Anesthestic Checklist: patient identified, IV checked, risks and benefits discussed, informed consent, monitors and equipment checked, pre-op evaluation, at physician/surgeon's request and post-op pain management  Timeout:       Correct Patient: Yes        Correct Procedure: Yes        Correct Site: Yes        Correct Position: Yes   Procedure Documentation  Procedure: intrathecal injection         Patient Position: sitting       Skin prep: Chloraprep       Insertion Site: L3-4. (midline approach).       Needle Gauge: 24.        Needle Length (Inches): 4        Spinal Needle Type: Pencan       Introducer used       Introducer: 20 G       # of attempts: 1 and  # of redirects:  0    Assessment/Narrative         Paresthesias: No.       CSF fluid: clear.       Opening pressure was cmH2O while  Sitting.      Medication(s) Administered   0.75% Hyperbaric Bupivacaine (Intrathecal) - Intrathecal   1.6 mL - 2/17/2025 1:07:00 PM  Fentanyl PF (Intrathecal) - Intrathecal   15 mcg - 2/17/2025 1:07:00 PM  Morphine PF 1 mg/mL (Intrathecal) - Intrathecal   0.15 mg - 2/17/2025 1:07:00 PM  Medication Administration Time: 2/17/2025 1:07 PM     Comments:  Patient tolerated well, no complications noted. Clear, free-flowing CSF obtained. NO heme and NO paresthesias during procedure. +gentle barbotage at beginning, middle and end of injection. Easy injection. Setting up well.         FOR [facility] ([facility]) ONLY:   Pain Team Contact information: please page the Pain Team Via [employer]. Search \"Pain\". During daytime hours, please page the attending first. At night please page the resident first.      "

## 2025-02-17 NOTE — OP NOTE
PROCEDURE NOTE:      NAME:  Meghann Burt   RECORD # [id number]   ADMIT DATE: 2025    DATE OF SERVICE: 2025     PREOPERATIVE DIAGNOSIS: previous uterine incision kronig    PROCEDURE: Low transverse  section      SURGEON:  Adam Huerta MD     ASSISTANT:  Anya Palmer's presence was requested because of suspected macrosomia and there was no other qualified assisstany available. She was present and helped with exposure, retraction, delivery of the infant and closure. She was present throughout the procedure.    ANESTHESIA: spinal    Delivery QBL (mL): 300     DRAINS: Rocha catheter.    COMPLICATIONS: None    FINDINGS: Normal uterus, tubes and ovaries bilateral. Normal appearance to the adnexae.  Live male infant born, Apgars of 9   at 1 minute, 9  at 5 minutes,  Weight (oz):  129 oz.    CONSENT: Patient was met preoperatively where we discussed the procedure and the risks associated with the procedure.  She understood these to include but not limited to injury to adjacent organs including bowel, bladder, ureter, infection and bleeding. Understanding these risks her consents were signed.      PROCEDURE: Patient was brought to the operating room in stable condition.  After induction of a spinal anesthetic, fetal heart tones were checked and were stable. She was prepped and draped in sterile fashion for the procedure.  A timeout was then performed.      A pfannensteil skin incision was made to the level of the fascia.  The fascia was incised laterally. Kocher clamps were applied to the superior aspect of the incision which was sharply dissected from the rectus muscles.  The kocher clamps were then reapplied to the inferior aspect of the incision which was similarly sharply dissected from the rectus muscles.  The rectus muscles were  bluntly in the midline.  The peritoneum was entered sharply. This incision was then extended.  A bladder blade was  introduced. The vesicouterine peritoneum was identified and a bladder flap was not created.  A low transverse uterine incision was made and amniotic sac was ruptured revealing clear amniotic fluid.  The baby's head was then delivered.There was not a nuchal cord noted. There was a spontaneous cry and therefore bulb suction was performed. The remainder of the infant was easily delivered. The cord was clamped x 2 and cut and the infant handed off to waiting nursing personnel.    The placenta was then manually removed from the uterus.  The uterus was exteriorized, covered with a moist laparotomy sponge and cleared of all clots and debris.  The uterine incision was closed with 0 chromic from both angles in a running locking suture. The uterus was returned to the abdominopelvic cavity.  The pericolic gutters were cleared of all clots and debris.  The uterine incision was again inspected and noted to be hemostatic.  The peritoneum was closed with suture superiorly to inferiorly.  The rectus muscles were made hemostatic with the use of electrocautery and brought together with figure-of-8 sutures of suture, the fascia was brought together with PDS loop from both angles in a running nonlocking suture, met at the midline, the subcutaneous tissues were irrigated, made hemostatic with use of electrocautery and brought together with 3-0 plain.  Skin was closed with staples.  Patient tolerated this procedure well.  Sponge, lap and needle counts were correct x two.    Adam Huerta MD      CC: Anya Palmer, Adam Huerta MD

## 2025-02-17 NOTE — PROGRESS NOTES
Pt all ready for csection. Reactive NST meds done concents signed labs drawn questions answered. Deanna and Sam here to see pt. Walked pt to OR about 1245

## 2025-02-17 NOTE — H&P
"[facility] Labor and Delivery History and Physical    Meghann Burt MRN# [id number]   Age: 29 year old YOB: 1995     Date of Admission:  2025    Primary care provider: Anya Palmer           Chief Complaint:   Meghann Burt is a 29 year old female who is 39w5d pregnant and being admitted for scheduled repeat C section.     To review: Planning repeat  as she was told after the first one that \"there's no way she would have come out vaginally\"- at 41 weeks 8lb 7oz; pushed for 3hr; had SROM 26hr and Triple I and needed .     She was originally scheduled last week @ ~39 wks but due to her  testing positive to Influenza A this procedure was postponed.     Payton is feeling well at this point (she did have mild flu like symptoms though test 1 week ago was negative and was offered treatment tamiflu which she declined. Of note, On Thursday/Friday last week she developed a fever and was retested at urgent care on Saturday but RSV/Flu/Strep all negative. She has been fever free for 24hr prior to today. Mild cough persists.)    + fetal movement   No vaginal bleeding, cramping, LOF.   No headaches, vision change, RUQ abdominal pain, LE swelling.      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   TSH 2., then 2.2024 (above goal <2.5 for fertility) and recently 2.2 on  after initiation of low dose levothyroxine 25mcg daily.   - recheck  normal range at 2.14      Family history of VSD (ventricular septal defect) in father  Normal fetal echo in 2nd trimester         Pregnancy history:     OBSTETRIC HISTORY:    OB History    Para Term  AB Living   2 1 1 0 0 1   SAB IAB Ectopic Multiple Live Births   0 0 0 0 1      # Outcome Date GA Lbr Germán/2nd Weight Sex Type Anes PTL Lv   2 Current            1 Term 09/15/22 41w1d 15:03 / 02:58 3.84 kg (8 lb 7.5 oz) F " "CS-LTranv EPI N LILIANE      Complications: Fetal Intolerance, Cephalopelvic Disproportion      Name: CONY,GIRL SUREKHA      Apgar1: 8  Apgar5: 9      Obstetric Comments   csection for SROM 26hr, Triple I, fetal decels after 3hr pushing. 8lb7oz despite Measuring small at 41 weeks.        EDC: Estimated Date of Delivery: 25    Prenatal Labs:   Lab Results   Component Value Date    AS Negative 2024    HEPBANG Nonreactive 2024    HGB 12.2 2024       GBS Status:   No results found for: \"GBS\"    Active Problem List  Patient Active Problem List   Diagnosis    History of  section    Family history of high cholesterol    Food intolerance    Hyperhidrosis    Menorrhagia    Subclinical hypothyroidism    History of thyroid disorder    Encounter for triage in pregnant patient       Medication Prior to Admission  Medications Prior to Admission   Medication Sig Dispense Refill Last Dose/Taking    acetaminophen (TYLENOL) 500 MG tablet Take 500-1,000 mg by mouth every 6 hours as needed for mild pain.       albuterol (PROAIR HFA/PROVENTIL HFA/VENTOLIN HFA) 108 (90 Base) MCG/ACT inhaler Inhale 2 puffs into the lungs every 6 hours as needed for shortness of breath or wheezing. (Patient not taking: Reported on 2025) 8.5 g 11     guaiFENesin (MUCINEX) 600 MG 12 hr tablet Take 2 tablets (1,200 mg) by mouth 2 times daily. (Patient not taking: Reported on 2025) 30 tablet 0     levothyroxine (SYNTHROID/LEVOTHROID) 25 MCG tablet TAKE 1 TABLET BY MOUTH EVERY DAY 90 tablet 1     ondansetron (ZOFRAN ODT) 4 MG ODT tab Take 1 tablet (4 mg) by mouth every 8 hours as needed for nausea (Patient not taking: Reported on 2/15/2025) 30 tablet 2     Prenatal Vit-Fe Fumarate-FA (PRENATAL MULTIVITAMIN  PLUS IRON) 27-1 MG TABS Take by mouth daily. (Patient not taking: Reported on 2/15/2025)      .        Maternal Past Medical History:   No past medical history on file.              Subclinical hypothyroidism          " Family History:   I have reviewed this patient's family history and commented on sigificant items within the HPI            Social History:     Social History     Social History Narrative    . Daughter Annalise -csection for SROM 26hr, Triple I, fetal decels after 3hr pushing. 8lb7oz despite Measuring small at 41 weeks.         From [facility]. She is a  with  [employer].  works with [employer]. Social alcohol 1x/every few weeks. Never smoker. Loves biking, golf, and aerobic dance videos, like Elsa. Exercises 3x/week.   Anya Palmer MD                Review of Systems:   The Review of Systems is negative other than noted in the HPI          Physical Exam:   Vitals were reviewed  General: Alert, no acute distress.   HEENT: normocephalic conjunctivae are clear. EOMI  Neck: supple   Lungs: Normal effort  Heart: regular rate  Abdomen: soft, gravid  Skin: clear without rash or lesions  Neuro: alert, oriented  Psych: mood good, affect appropriate, good eye contact, insight and judgment intact, normal speech pattern.     Cervix:not checked  Presentation:Cephalic  Fetal Heart Rate Tracing: reactive and reassuring  Tocometer: external monitor                       Assessment:   Meghann Burt is a 39w5d pregnant female admitted with elective repeat C section given suspected CPD on prior birth experience.          Plan:   Admit - see IP orders  Prepare for  section with Dr Huerta and myself to assist      Anya Palmer MD

## 2025-02-17 NOTE — ANESTHESIA CARE TRANSFER NOTE
Patient: Meghann Burt    Procedure: Procedure(s):  REPEAT  SECTION       Diagnosis: Uterine scar from previous surgery affecting pregnancy [O34.29]  Diagnosis Additional Information: No value filed.    Anesthesia Type:   No value filed.     Note:    Oropharynx: spontaneously breathing and oropharynx clear of all foreign objects  Level of Consciousness: awake  Oxygen Supplementation: face mask  Level of Supplemental Oxygen (L/min / FiO2): 6  Independent Airway: airway patency satisfactory and stable  Dentition: dentition unchanged  Vital Signs Stable: post-procedure vital signs reviewed and stable  Report to RN Given: handoff report given  Patient transferred to: Labor and Delivery    Handoff Report: Identifed the Patient, Identified the Reponsible Provider, Reviewed the pertinent medical history, Discussed the surgical course, Reviewed Intra-OP anesthesia mangement and issues during anesthesia, Set expectations for post-procedure period and Allowed opportunity for questions and acknowledgement of understanding      Vitals:  Vitals Value Taken Time   BP 97/54 25 1404   Temp     Pulse 82 25 1404   Resp 20 25 1404   SpO2 99 % 25 1404       Electronically Signed By: IFRAH Guaman CRNA  2025  2:04 PM

## 2025-02-17 NOTE — ANESTHESIA PREPROCEDURE EVALUATION
"Anesthesia Pre-Procedure Evaluation    Patient: Meghann Burt   MRN: [id number] : 1995        Procedure : Procedure(s):  REPEAT  SECTION          No past medical history on file.   Past Surgical History:   Procedure Laterality Date     SECTION  09/15/2022      No Known Allergies   Social History     Tobacco Use    Smoking status: Never     Passive exposure: Never    Smokeless tobacco: Never   Substance Use Topics    Alcohol use: Not Currently      Wt Readings from Last 1 Encounters:   02/15/25 66.7 kg (147 lb)        Anesthesia Evaluation   Pt has had prior anesthetic. Type: Regional.    No history of anesthetic complications       ROS/MED HX  ENT/Pulmonary:     (+)                      asthma                  Neurologic:       Cardiovascular:    (-) murmur and wheezes   METS/Exercise Tolerance:     Hematologic:  - neg hematologic  ROS     Musculoskeletal:       GI/Hepatic:       Renal/Genitourinary:       Endo:     (+)          thyroid problem,            Psychiatric/Substance Use:       Infectious Disease:       Malignancy:       Other:      (+)  , ,previous          Physical Exam    Airway        Mallampati: I   TM distance: > 3 FB   Neck ROM: full   Mouth opening: > 3 cm    Respiratory Devices and Support         Dental  no notable dental history         Cardiovascular   cardiovascular exam normal      (-) no murmur    Pulmonary   pulmonary exam normal        (-) no wheezes        OUTSIDE LABS:  CBC:   Lab Results   Component Value Date    WBC 7.4 2025    WBC 6.8 2024    HGB 11.2 (L) 2025    HGB 12.2 2024    HCT 33.0 (L) 2025    HCT 35.6 2024     2025     2024     BMP:   Lab Results   Component Value Date    GLC 88 2023    GLC 93 2019     COAGS: No results found for: \"PTT\", \"INR\", \"FIBR\"  POC: No results found for: \"BGM\", \"HCG\", \"HCGS\"  HEPATIC: No results found for: \"ALBUMIN\", \"PROTTOTAL\", \"ALT\", " "\"AST\", \"GGT\", \"ALKPHOS\", \"BILITOTAL\", \"BILIDIRECT\", \"IVAN\"  OTHER:   Lab Results   Component Value Date    TSH 2.14 11/27/2024       Anesthesia Plan    ASA Status:  2    NPO Status:  NPO Appropriate    Anesthesia Type: Spinal.              Consents    Anesthesia Plan(s) and associated risks, benefits, and realistic alternatives discussed. Questions answered and patient/representative(s) expressed understanding.     - Discussed:     - Discussed with:  Patient      - Extended Intubation/Ventilatory Support Discussed: No.      - Patient is DNR/DNI Status: No     Use of blood products discussed: Yes.     - Discussed with: Patient.     - Consented: consented to blood products     Postoperative Care    Pain management: intrathecal morphine.   PONV prophylaxis: Ondansetron (or other 5HT-3)     Comments:    Other Comments: Spinal with duramorph for repeat C/S. Consented for TAP blocks.           Allison Coon MD    I have reviewed the pertinent notes and labs in the chart from the past 30 days and (re)examined the patient.  Any updates or changes from those notes are reflected in this note.    Clinically Significant Risk Factors Present on Admission                                          "

## 2025-02-18 LAB — HGB BLD-MCNC: 10.5 G/DL (ref 11.7–15.7)

## 2025-02-18 PROCEDURE — 36415 COLL VENOUS BLD VENIPUNCTURE: CPT | Performed by: OBSTETRICS & GYNECOLOGY

## 2025-02-18 PROCEDURE — 250N000013 HC RX MED GY IP 250 OP 250 PS 637: Performed by: OBSTETRICS & GYNECOLOGY

## 2025-02-18 PROCEDURE — 250N000011 HC RX IP 250 OP 636: Performed by: OBSTETRICS & GYNECOLOGY

## 2025-02-18 PROCEDURE — 250N000013 HC RX MED GY IP 250 OP 250 PS 637: Performed by: FAMILY MEDICINE

## 2025-02-18 PROCEDURE — 85018 HEMOGLOBIN: CPT | Performed by: OBSTETRICS & GYNECOLOGY

## 2025-02-18 PROCEDURE — 120N000001 HC R&B MED SURG/OB

## 2025-02-18 RX ORDER — LEVOTHYROXINE SODIUM 25 UG/1
25 TABLET ORAL
Status: DISCONTINUED | OUTPATIENT
Start: 2025-02-18 | End: 2025-02-19 | Stop reason: HOSPADM

## 2025-02-18 RX ORDER — GUAIFENESIN 200 MG/10ML
200 LIQUID ORAL EVERY 4 HOURS PRN
Status: DISCONTINUED | OUTPATIENT
Start: 2025-02-18 | End: 2025-02-19 | Stop reason: HOSPADM

## 2025-02-18 RX ORDER — IBUPROFEN 800 MG/1
800 TABLET, FILM COATED ORAL EVERY 6 HOURS
Status: DISCONTINUED | OUTPATIENT
Start: 2025-02-18 | End: 2025-02-19 | Stop reason: HOSPADM

## 2025-02-18 RX ADMIN — SENNOSIDES AND DOCUSATE SODIUM 1 TABLET: 50; 8.6 TABLET ORAL at 08:11

## 2025-02-18 RX ADMIN — ACETAMINOPHEN 975 MG: 325 TABLET ORAL at 14:51

## 2025-02-18 RX ADMIN — IBUPROFEN 800 MG: 800 TABLET, FILM COATED ORAL at 19:03

## 2025-02-18 RX ADMIN — SENNOSIDES AND DOCUSATE SODIUM 2 TABLET: 50; 8.6 TABLET ORAL at 20:08

## 2025-02-18 RX ADMIN — IBUPROFEN 800 MG: 800 TABLET, FILM COATED ORAL at 12:57

## 2025-02-18 RX ADMIN — KETOROLAC TROMETHAMINE 15 MG: 15 INJECTION, SOLUTION INTRAMUSCULAR; INTRAVENOUS at 06:32

## 2025-02-18 RX ADMIN — LEVOTHYROXINE SODIUM 25 MCG: 0.03 TABLET ORAL at 12:56

## 2025-02-18 RX ADMIN — SIMETHICONE 80 MG: 80 TABLET, CHEWABLE ORAL at 11:34

## 2025-02-18 RX ADMIN — ACETAMINOPHEN 975 MG: 325 TABLET ORAL at 08:11

## 2025-02-18 RX ADMIN — ACETAMINOPHEN 975 MG: 325 TABLET ORAL at 20:09

## 2025-02-18 RX ADMIN — ACETAMINOPHEN 975 MG: 325 TABLET ORAL at 02:00

## 2025-02-18 RX ADMIN — GUAIFENESIN 200 MG: 100 SOLUTION ORAL at 23:13

## 2025-02-18 ASSESSMENT — ACTIVITIES OF DAILY LIVING (ADL)
ADLS_ACUITY_SCORE: 19
ADLS_ACUITY_SCORE: 20
ADLS_ACUITY_SCORE: 20
ADLS_ACUITY_SCORE: 19
ADLS_ACUITY_SCORE: 20
ADLS_ACUITY_SCORE: 19
ADLS_ACUITY_SCORE: 20
ADLS_ACUITY_SCORE: 20
ADLS_ACUITY_SCORE: 19
ADLS_ACUITY_SCORE: 20
ADLS_ACUITY_SCORE: 19
ADLS_ACUITY_SCORE: 20
ADLS_ACUITY_SCORE: 20
ADLS_ACUITY_SCORE: 19
ADLS_ACUITY_SCORE: 20
ADLS_ACUITY_SCORE: 19

## 2025-02-18 NOTE — PROGRESS NOTES
Obstetrics Post-Op Progress Note         Assessment and Plan:    Assessment: Meghann Burt is a 29 year old  Post-operative day #1 s/p Low transverse repeat  section doing well.     L&D complications: Uterine scar from previous surgery affecting pregnancy [O34.29]  Suspected macrosomia.           Plan:   Ambulation encouraged  Breast feeding strategies discussed  Monitor wound for signs of infection  Pain control measures as needed  Reportable signs and symptoms dicussed with the patient  Anticipate discharge in 1-2 days           Interval History:   Doing well.  Pain is well-controlled.  No fevers.  No history of wound drainage, warmth or significant erythema.  Good appetite.  Denies chest pain, shortness of breath, nausea or vomiting.  Ambulatory.  Breastfeeding well.  She had been unable to void post emanuel removal and had a straight cath this morning around 6:20.  She has now been up to the bathroom and reports voiding without difficulty.              Physical Exam:   Temp: 98.3  F (36.8  C) Temp src: Oral BP: 92/53 Pulse: 65   Resp: 16 SpO2: 99 % O2 Device: None (Room air)    There were no vitals filed for this visit.  Vital Signs with Ranges  Temp:  [97.6  F (36.4  C)-98.3  F (36.8  C)] 98.3  F (36.8  C)  Pulse:  [] 65  Resp:  [15-20] 16  BP: ()/(50-68) 92/53  SpO2:  [96 %-100 %] 99 %  I/O last 3 completed shifts:  In: 1800 [P.O.:900; I.V.:900]  Out: 4029 [Urine:3625; Blood:404]    Uterine fundus is firm, non-tender and at the level of the umbilicus  Incision C/D/I          Data:     Recent Results (from the past 24 hours)   CBC with platelets    Collection Time: 25 11:57 AM   Result Value Ref Range    WBC Count 7.4 4.0 - 11.0 10e3/uL    RBC Count 3.96 3.80 - 5.20 10e6/uL    Hemoglobin 11.2 (L) 11.7 - 15.7 g/dL    Hematocrit 33.0 (L) 35.0 - 47.0 %    MCV 83 78 - 100 fL    MCH 28.3 26.5 - 33.0 pg    MCHC 33.9 31.5 - 36.5 g/dL    RDW 12.6 10.0 - 15.0 %    Platelet Count  158 150 - 450 10e3/uL   Treponema Abs w Reflex to RPR and Titer    Collection Time: 02/17/25 11:57 AM   Result Value Ref Range    Treponema Antibody Total Nonreactive Nonreactive   Adult Type and Screen    Collection Time: 02/17/25 11:57 AM   Result Value Ref Range    ABO/RH(D) B POS     Antibody Screen Negative Negative    SPECIMEN EXPIRATION DATE [id number]    Hemoglobin    Collection Time: 02/18/25  7:45 AM   Result Value Ref Range    Hemoglobin 10.5 (L) 11.7 - 15.7 g/dL     Recent Labs   Lab Test 02/17/25  1157   AS Negative     Recent Labs   Lab Test 02/18/25  0745 02/17/25  1157   HGB 10.5* 11.2*     Recent Labs   Lab Test 08/08/24  1156   RUQIGG Positive       Tina Lee, APRN, CNP  [facility]  [phone]

## 2025-02-18 NOTE — PLAN OF CARE
Problem: Adult Inpatient Plan of Care  Goal: Plan of Care Review  Description: The Plan of Care Review/Shift note should be completed every shift.  The Outcome Evaluation is a brief statement about your assessment that the patient is improving, declining, or no change.  This information will be displayed automatically on your shift  note.  Outcome: Progressing     Problem: Postpartum ( Delivery)  Goal: Hemostasis  Outcome: Progressing     Problem: Postpartum ( Delivery)  Goal: Effective Urinary Elimination  Outcome: Progressing     VSS. Pain is being managed with scheduled medications at this time. PRN Simethicone given x1 this shift. Pt still has cough, PRN Robitussin ordered and pt aware she can ask for it anytime. Bleeding WDL. Fundus firm and midline. Incision is covered with silver dressing and is clean, dry and intact. Voids spontaneously. Pt is bonding well with baby and breastfeeding adequately.

## 2025-02-18 NOTE — PLAN OF CARE
Goal Outcome Evaluation:                    Problem: Adult Inpatient Plan of Care  Goal: Optimal Comfort and Wellbeing  Outcome: Progressing  Intervention: Monitor Pain and Promote Comfort  Recent Flowsheet Documentation  Taken 2025 by Shelby Schaffer RN  Pain Management Interventions:   medication (see MAR)   cold applied     Problem: Postpartum ( Delivery)  Goal: Optimal Pain Control and Function  Outcome: Progressing  Intervention: Prevent or Manage Pain  Recent Flowsheet Documentation  Taken 2025 by Shelby Schaffer RN  Pain Management Interventions:   medication (see MAR)   cold applied    VSS.  Pt is feeling pain despite TAP blocks.  First dose of post-op tylenol was at 1800.  Oxy offered but pt declined.  Was able to stand at bedside, but became slightly dizzy.  Improved when patient laid down.  Fundus firm at U/2.  Bleeding WDL.  No drainage on incision dressing and ice to site.  Rocha is in place with good output.  Bonding well with baby.  Partner supportive at bedside.

## 2025-02-18 NOTE — PLAN OF CARE
Problem: Adult Inpatient Plan of Care  Goal: Optimal Comfort and Wellbeing  Intervention: Provide Person-Centered Care  Recent Flowsheet Documentation  Taken 2025 0150 by Heidi Sweeney RN  Trust Relationship/Rapport:   care explained   choices provided   emotional support provided   empathic listening provided   questions answered   questions encouraged   reassurance provided   thoughts/feelings acknowledged     Problem: Postpartum ( Delivery)  Goal: Hemostasis  Outcome: Progressing       Vitals, fundal assessment, and lochia wdl. Bonding well with infant. Ambulating independently. Pain is being managed by tylenol and toradol. Pt unable to void   hours post emanuel removal. Straight cath performed. Next void/bladder scan needed at 0730, if still unable to void.    Heidi Sweeney RN

## 2025-02-19 VITALS
HEART RATE: 68 BPM | TEMPERATURE: 97.8 F | RESPIRATION RATE: 16 BRPM | SYSTOLIC BLOOD PRESSURE: 104 MMHG | OXYGEN SATURATION: 98 % | DIASTOLIC BLOOD PRESSURE: 69 MMHG

## 2025-02-19 PROCEDURE — 250N000013 HC RX MED GY IP 250 OP 250 PS 637: Performed by: OBSTETRICS & GYNECOLOGY

## 2025-02-19 PROCEDURE — 250N000013 HC RX MED GY IP 250 OP 250 PS 637: Performed by: FAMILY MEDICINE

## 2025-02-19 PROCEDURE — 250N000011 HC RX IP 250 OP 636: Performed by: OBSTETRICS & GYNECOLOGY

## 2025-02-19 RX ORDER — OXYCODONE HYDROCHLORIDE 5 MG/1
5 TABLET ORAL EVERY 4 HOURS PRN
Qty: 10 TABLET | Refills: 0 | Status: SHIPPED | OUTPATIENT
Start: 2025-02-19

## 2025-02-19 RX ORDER — AMOXICILLIN 250 MG
1-2 CAPSULE ORAL 2 TIMES DAILY PRN
COMMUNITY
Start: 2025-02-19

## 2025-02-19 RX ORDER — IBUPROFEN 200 MG
800 TABLET ORAL EVERY 8 HOURS PRN
COMMUNITY
Start: 2025-02-19

## 2025-02-19 RX ORDER — ACETAMINOPHEN 325 MG/1
975 TABLET ORAL EVERY 6 HOURS PRN
COMMUNITY
Start: 2025-02-19

## 2025-02-19 RX ADMIN — IBUPROFEN 800 MG: 800 TABLET, FILM COATED ORAL at 08:40

## 2025-02-19 RX ADMIN — ACETAMINOPHEN 975 MG: 325 TABLET ORAL at 08:40

## 2025-02-19 RX ADMIN — ACETAMINOPHEN 975 MG: 325 TABLET ORAL at 02:45

## 2025-02-19 RX ADMIN — ONDANSETRON 4 MG: 4 TABLET, ORALLY DISINTEGRATING ORAL at 00:01

## 2025-02-19 RX ADMIN — LEVOTHYROXINE SODIUM 25 MCG: 0.03 TABLET ORAL at 07:04

## 2025-02-19 RX ADMIN — SENNOSIDES AND DOCUSATE SODIUM 1 TABLET: 50; 8.6 TABLET ORAL at 08:40

## 2025-02-19 RX ADMIN — IBUPROFEN 800 MG: 800 TABLET, FILM COATED ORAL at 02:45

## 2025-02-19 ASSESSMENT — ACTIVITIES OF DAILY LIVING (ADL)
ADLS_ACUITY_SCORE: 19

## 2025-02-19 NOTE — PLAN OF CARE
Problem: Adult Inpatient Plan of Care  Goal: Plan of Care Review  Description: The Plan of Care Review/Shift note should be completed every shift.  The Outcome Evaluation is a brief statement about your assessment that the patient is improving, declining, or no change.  This information will be displayed automatically on your shift  note.  Outcome: Progressing     Problem: Postpartum ( Delivery)  Goal: Absence of Infection Signs and Symptoms  Outcome: Progressing     Problem: Postpartum ( Delivery)  Goal: Effective Bowel Elimination  Outcome: Progressing     Problem: Postpartum ( Delivery)  Goal: Nausea and Vomiting Relief  Outcome: Progressing     Problem: Postpartum ( Delivery)  Goal: Effective Urinary Elimination  Outcome: Progressing   Goal Outcome Evaluation:  Pt vitals stable overnight. Firm and light bleeding. Pain controlled with ibuprofen and tylenol overnight.

## 2025-02-19 NOTE — PLAN OF CARE
"  Problem: Postpartum ( Delivery)  Goal: Fluid and Electrolyte Balance  Outcome: Met  Goal: Anesthesia/Sedation Recovery  Outcome: Met  Goal: Nausea and Vomiting Relief  Outcome: Met  Goal: Effective Urinary Elimination  Outcome: Met  Goal: Effective Oxygenation and Ventilation  Outcome: Met     Problem: Adult Inpatient Plan of Care  Goal: Plan of Care Review  Description: The Plan of Care Review/Shift note should be completed every shift.  The Outcome Evaluation is a brief statement about your assessment that the patient is improving, declining, or no change.  This information will be displayed automatically on your shift  note.  Outcome: Adequate for Care Transition  Goal: Patient-Specific Goal (Individualized)  Description: You can add care plan individualizations to a care plan. Examples of Individualization might be:  \"Parent requests to be called daily at 9am for status\", \"I have a hard time hearing out of my right ear\", or \"Do not touch me to wake me up as it startles  me\".  Outcome: Adequate for Care Transition  Goal: Absence of Hospital-Acquired Illness or Injury  Outcome: Adequate for Care Transition  Goal: Optimal Comfort and Wellbeing  Outcome: Adequate for Care Transition  Intervention: Monitor Pain and Promote Comfort  Recent Flowsheet Documentation  Taken 2025 4420 by Kayla Monteiro RN  Pain Management Interventions: medication (see MAR)  Goal: Readiness for Transition of Care  Outcome: Adequate for Care Transition     Problem: Postpartum ( Delivery)  Goal: Successful Parent Role Transition  Outcome: Adequate for Care Transition  Goal: Hemostasis  Outcome: Adequate for Care Transition  Goal: Effective Bowel Elimination  Outcome: Adequate for Care Transition  Goal: Absence of Infection Signs and Symptoms  Outcome: Adequate for Care Transition  Goal: Optimal Pain Control and Function  Outcome: Adequate for Care Transition  Intervention: Prevent or Manage Pain  Recent Flowsheet " Documentation  Taken 2/19/2025 0840 by Kayla Monteiro, RN  Pain Management Interventions: medication (see MAR)

## 2025-02-19 NOTE — PLAN OF CARE
Patient discharged home around 1215 with significant other and infant. Discharge education completed and AVS printed and reviewed. All personal belongings were returned to patient. Patient verbalized and demonstrated understanding and had no further questions or concerns. Patient requested wheelchair transportation and was pushed to front lobby accompanied by hospital staff.

## 2025-02-19 NOTE — PLAN OF CARE
Problem: Postpartum ( Delivery)  Goal: Fluid and Electrolyte Balance  Outcome: Met  Goal: Anesthesia/Sedation Recovery  Outcome: Met  Goal: Nausea and Vomiting Relief  Outcome: Met  Goal: Effective Urinary Elimination  Outcome: Met  Goal: Effective Oxygenation and Ventilation  Outcome: Met     Patient bonding with . Ambulating and voiding independently. Dressing CDI. Pain managed with medication (see MAR). Resting between cares.

## 2025-02-19 NOTE — DISCHARGE SUMMARY
Discharge Summary    Meghann Burt MRN# [id number]   Age: 29 year old YOB: 1995     Date of Admission:  2025  Date of Discharge::  2025  Admitting Physician:  Adam Huerta MD  Discharge Physician:  Jessica Moritz, APRN CNP     Home clinic:   Dr. Sorto, [facility]          Admission Diagnoses:   Uterine scar from previous surgery affecting pregnancy [O34.29]  Intrauterine pregnancy at 39w5d  Thyroid disorder in pregnancy  Family history of VSD--fetal echo WNL          Discharge Diagnosis:   Same   Anemia   S/p  delivery          Procedures:     Procedure(s): Low transverse repeat  delivery via Pfannenstiel incision  No additional procedures performed              Medications Prior to Admission:     Medications Prior to Admission   Medication Sig Dispense Refill Last Dose/Taking    levothyroxine (SYNTHROID/LEVOTHROID) 25 MCG tablet TAKE 1 TABLET BY MOUTH EVERY DAY 90 tablet 1 2025 Morning    Prenatal Vit-Fe Fumarate-FA (PRENATAL MULTIVITAMIN  PLUS IRON) 27-1 MG TABS Take by mouth daily.   2025 Morning    [DISCONTINUED] acetaminophen (TYLENOL) 500 MG tablet Take 500-1,000 mg by mouth every 6 hours as needed for mild pain.       [DISCONTINUED] albuterol (PROAIR HFA/PROVENTIL HFA/VENTOLIN HFA) 108 (90 Base) MCG/ACT inhaler Inhale 2 puffs into the lungs every 6 hours as needed for shortness of breath or wheezing. (Patient not taking: Reported on 2025) 8.5 g 11     [DISCONTINUED] guaiFENesin (MUCINEX) 600 MG 12 hr tablet Take 2 tablets (1,200 mg) by mouth 2 times daily. (Patient not taking: Reported on 2025) 30 tablet 0     [DISCONTINUED] ondansetron (ZOFRAN ODT) 4 MG ODT tab Take 1 tablet (4 mg) by mouth every 8 hours as needed for nausea (Patient not taking: Reported on 2/15/2025) 30 tablet 2              Discharge Medications:        Review of your medicines        START taking        Dose / Directions   ibuprofen  200 MG tablet  Commonly known as: ADVIL/MOTRIN  Used for: S/P  section      Dose: 800 mg  Take 4 tablets (800 mg) by mouth every 8 hours as needed for moderate pain.  Refills: 0     oxyCODONE 5 MG tablet  Commonly known as: ROXICODONE  Used for: S/P  section      Dose: 5 mg  Take 1 tablet (5 mg) by mouth every 4 hours as needed for moderate to severe pain.  Quantity: 10 tablet  Refills: 0     senna-docusate 8.6-50 MG tablet  Commonly known as: SENOKOT-S/PERICOLACE  Used for: S/P  section      Dose: 1-2 tablet  Take 1-2 tablets by mouth 2 times daily as needed for constipation.  Refills: 0            CONTINUE these medicines which may have CHANGED, or have new prescriptions. If we are uncertain of the size of tablets/capsules you have at home, strength may be listed as something that might have changed.        Dose / Directions   acetaminophen 325 MG tablet  Commonly known as: TYLENOL  This may have changed:   medication strength  how much to take  Used for: S/P  section      Dose: 975 mg  Take 3 tablets (975 mg) by mouth every 6 hours as needed for mild pain.  Refills: 0            CONTINUE these medicines which have NOT CHANGED        Dose / Directions   levothyroxine 25 MCG tablet  Commonly known as: SYNTHROID/LEVOTHROID  Used for: History of thyroid disorder      Dose: 25 mcg  TAKE 1 TABLET BY MOUTH EVERY DAY  Quantity: 90 tablet  Refills: 1     prenatal multivitamin  plus iron 27-1 MG Tabs      Take by mouth daily.  Refills: 0            STOP taking      albuterol 108 (90 Base) MCG/ACT inhaler  Commonly known as: PROAIR HFA/PROVENTIL HFA/VENTOLIN HFA        guaiFENesin 600 MG 12 hr tablet  Commonly known as: MUCINEX        ondansetron 4 MG ODT tab  Commonly known as: ZOFRAN ODT                  Where to get your medicines        These medications were sent to [id number] - [place], [place] - [address] [facility] [address]   [address],  [facility] [address]      Phone: [phone]   oxyCODONE 5 MG tablet       Some of these will need a paper prescription and others can be bought over the counter. Ask your nurse if you have questions.    You don't need a prescription for these medications  acetaminophen 325 MG tablet  ibuprofen 200 MG tablet  senna-docusate 8.6-50 MG tablet               Consultations:   No consultations were requested during this admission          Brief History of Labor:   Meghann Burt is a 29 year old  who presented to the birthplace for scheduled repeat  section.     She was originally scheduled last week @ ~39 wks but due to her  testing positive to Influenza A this procedure was postponed.     Payton is feeling well at this point (she did have mild flu like symptoms though test 1 week ago was negative and was offered treatment tamiflu which she declined. Of note, On Thursday/Friday last week she developed a fever and was retested at urgent care on Saturday but RSV/Flu/Strep all negative. She has been fever free for 24hr prior to today. Mild cough persists.)           Hospital Course:   The patient's hospital course was unremarkable. She recovered as anticipated and experienced no post-operative complications. On discharge, her pain was well controlled. Vaginal bleeding is similar to peak menstrual flow.  Voiding without difficulty.  Ambulating well and tolerating a normal diet.  No fever or significant wound drainage.  Breastfeeding.  Infant is stable.  She was discharged on post-partum day #2. She will resume her prenatal with iron.    Post-partum hemoglobin:   Hemoglobin   Date Value Ref Range Status   2025 10.5 (L) 11.7 - 15.7 g/dL Final       Day of discharge assessment:   /59 (BP Location: Left arm, Patient Position: Semi-Peck's, Cuff Size: Adult Regular)   Pulse 61   Temp 97.7  F (36.5  C) (Oral)   Resp 16   LMP 05/15/2024 (Exact Date)   SpO2 97%   Breastfeeding Unknown    Abdomen: Soft, fundus firm, incision with mepilex on. CDI           Discharge Instructions and Follow-Up:     Discharge diet: Regular   Discharge activity: Your activity upon discharge: no lifting >15 lbs or strenuous exercise for 6 weeks, no driving for 2 weeks or until you can slam on the breaks w/o pain, nothing in the vagina for 6 weeks (no tampons, intercourse, douching).    Discharge follow-up: Two weeks for incision check  6 weeks for postpartum visit.   Wound care: Remove bandage 7 days after surgery  Keep incision clean and dry.           Discharge Disposition:     Discharged to home      Attestation:  I have reviewed today's vital signs, notes, medications, labs and imaging.  Amount of time performed on this discharge summary: 10 minutes.  Total time: 15 minutes    Jessica Moritz, APRN CNP

## 2025-02-19 NOTE — PROGRESS NOTES
Outreach [facility] for [facility]    Meghann Burt  [id number]  1995    Discharge follow-up plan discussed with patient, needs assessed. Pt requests all follow-up through clinic/physician, declines home care visit, unless medically indicated and ordered by physician, and declines follow-up phone call.   No further needs identified at this time.

## 2025-02-19 NOTE — LACTATION NOTE
"This note was copied from a baby's chart.  Rounded with family for lactation support per lactation consult request.    Baby Name/Birth Order/Breastfeeding History: erin Keyes baby    Maternal Risk Factors:  Scheduled     Nipple issues: everted    Latch achieved: deep latch and audible swallows    Best position:  cross cradle    Nipple shield: no    Pumping: no    Has Home Pump? yes and Brand? Medela and Momcozy M9    Ed folder reviewed: yes and  ed folder      Educated/reviewed hand expression using a C Hold and \"press, compress and release\".  Mom had success  with deep breast compression. Mother was able to latch baby in cross cradle hold on left side. Mom reported it was pinchy, lowered babies chin and mom felt a more comfortable latch. Audible swallows were noted.     Educated/reviewed milk production of supply and demand.  Encouraged mom to breastfeed on demand with a goal of 8 feedings per day to help milk production. Reviewed expectation of transitional milk arriving by 3-5 days of life and mature milk by 2 weeks of life.  Educated on importance of frequent breastfeeding or milk expression to establish a healthy milk supply.    Educated/reviewed signs of milk transfer with gentle tug at the breast, audible swallows and wet and soiled diapers per the education folder I & O.     Reviewed use of education folder for self learning, lactation and community support, indicators to call MD and maternal/family well being.    Provided education and a resource/teaching sheet with QR codes for video support/education for:  Hand expressing breastmilk  Achieving a Deep Asymmetrical Latch  Breastfeeding Positions  How to Choose a breast pump flange size   Side Lying paced bottle feeding (if supplementation is needed)  Application of a Nipple Shield  Treating Engorgement  Post Partum Support in the community       "

## 2025-02-19 NOTE — DISCHARGE INSTRUCTIONS
Warning Signs after Having a Baby    Keep this paper on your fridge or somewhere else where you can see it.    Call your provider if you have any of these symptoms up to 12 weeks after having your baby.    Thoughts of hurting yourself or your baby  Pain in your chest or trouble breathing  Severe headache not helped by pain medicine  Eyesight concerns (blurry vision, seeing spots or flashes of light, other changes to eyesight)  Fainting, shaking or other signs of a seizure    Call 9-1-1 if you feel that it is an emergency.     The symptoms below can happen to anyone after giving birth. They can be very serious. Call your provider if you have any of these warning signs.    My provider s phone number: _______________________    Losing too much blood (hemorrhage)    Call your provider if you soak through a pad in less than an hour or pass blood clots bigger than a golf ball. These may be signs that you are bleeding too much.    Blood clots in the legs or lungs    After you give birth, your body naturally clots its blood to help prevent blood loss. Sometimes this increased clotting can happen in other areas of the body, like the legs or lungs. This can block your blood flow and be very dangerous.     Call your provider if you:  Have a red, swollen spot on the back of your leg that is warm or painful when you touch it.   Are coughing up blood.     Infection    Call your provider if you have any of these symptoms:  Fever of 100.4 F (38 C) or higher.  Pain or redness around your stitches if you had an incision.   Any yellow, white, or green fluid coming from places where you had stitches or surgery.    Mood Problems (postpartum depression)    Many people feel sad or have mood changes after having a baby. But for some people, these mood swings are worse.     Call your provider right away if you feel so anxious or nervous that you can't care for yourself or your baby.    Preeclampsia (high blood pressure)    Even if you  didn't have high blood pressure when you were pregnant, you are at risk for the high blood pressure disease called preeclampsia. This risk can last up to 12 weeks after giving birth.     Call your provider if you have:   Pain on your right side under your rib cage  Sudden swelling in the hands and face    Remember: You know your body. If something doesn't feel right, get medical help.     For informational purposes only. Not to replace the advice of your health care provider. Copyright 2020 [facility]. All rights reserved. Clinically reviewed by Marissa Penny, RNC-OB, MSN. [employer] (Closed) [id number] - Rev 02/23.

## 2025-02-20 ENCOUNTER — PATIENT OUTREACH (OUTPATIENT)
Dept: CARE COORDINATION | Facility: CLINIC | Age: 30
End: 2025-02-20

## 2025-03-20 ENCOUNTER — MEDICAL CORRESPONDENCE (OUTPATIENT)
Dept: HEALTH INFORMATION MANAGEMENT | Facility: CLINIC | Age: 30
End: 2025-03-20
Payer: COMMERCIAL

## 2025-03-31 ENCOUNTER — PRENATAL OFFICE VISIT (OUTPATIENT)
Dept: FAMILY MEDICINE | Facility: CLINIC | Age: 30
End: 2025-03-31
Payer: COMMERCIAL

## 2025-03-31 VITALS
HEART RATE: 70 BPM | RESPIRATION RATE: 16 BRPM | BODY MASS INDEX: 22.51 KG/M2 | WEIGHT: 135.1 LBS | OXYGEN SATURATION: 100 % | HEIGHT: 65 IN | DIASTOLIC BLOOD PRESSURE: 72 MMHG | SYSTOLIC BLOOD PRESSURE: 104 MMHG

## 2025-03-31 DIAGNOSIS — Z86.39 HISTORY OF THYROID DISORDER: ICD-10-CM

## 2025-03-31 DIAGNOSIS — R10.32 LLQ ABDOMINAL PAIN: ICD-10-CM

## 2025-03-31 DIAGNOSIS — Z98.891 HISTORY OF CESAREAN SECTION: ICD-10-CM

## 2025-03-31 DIAGNOSIS — I86.3 VULVAR VARICOSE VEINS: ICD-10-CM

## 2025-03-31 DIAGNOSIS — Z13.9 SCREENING FOR CONDITION: ICD-10-CM

## 2025-03-31 LAB — HGB BLD-MCNC: 12.8 G/DL (ref 11.7–15.7)

## 2025-03-31 RX ORDER — LEVOTHYROXINE SODIUM 25 UG/1
25 TABLET ORAL DAILY
Qty: 90 TABLET | Refills: 1 | Status: SHIPPED | OUTPATIENT
Start: 2025-03-31

## 2025-03-31 NOTE — PATIENT INSTRUCTIONS
You can call the radiology department at [phone] to schedule your imaging test that was ordered.

## 2025-03-31 NOTE — PROGRESS NOTES
Assessment/plan   Meghann Burt is a 29 year old female who is established to my practice, here for a 6 week post partum visit.      Routine postpartum follow up  - Mood is good, declines need for additional resources. Mindfulness and exercise reviewed.  - Pap is not indicated but will collect at future IUD placement  - Hb will be checked  - Continue prenatal vitamin  - Continue increasing iron containing foods in diet  - Contraception:  Mirena IUD; desires to have me try first and can refer for US guidance if needed after that given her prior experience for IUD #2 in [place] requiring US guidance and difficult placement    History of  section  Healing well; a couple spitting sutures are starting to show but not bothering her; will recheck in 2 weeks    LLQ abdominal pain superior to incision; possible small muscular hematoma vs strain; ongoing x 2 weeks.   - consideration for pelvic ultrasound and patient was agreeable.    Vulvar varicose veins  Resolved    History of thyroid disorder  Recheck TSH; on Synthroid 25mcg daily      Follow up: for contraception management in 2 weeks with pap, mood check or annual exam as needed    Anya Palmer MD    Subjective:      HPI: Meghann Burt is a 29 year old female who is here for post partum visit.    Chief Complaint   Patient presents with    Postpartum Care     Pain on left side, move certain ways, lots of stinging and pain. Talk about birth control. Dissolvable staples still in.       Post partum check: I have fully reviewed the prenatal and intrapartum course.  39w5d at time of delivery.  Outcome: repeat  section, low transverse incision  Delivery course & outcome: Repeat  2025 with Dr. Lyons    Postpartum course has been complicated by some discomfort from her incision- - it's above the incision itself, started about 2 weeks ago. She has tracked when it bothers her:  Moving from sit to standing from the  ground  Certain chairs (deeper couch/rocking chair) when going from sit to stand   Certain ways of getting out of bed  Holding her infant [place] on the left side in some particular ways  Cough, sneezing and Bowel movement occasionally causes the same pain    Heat pack helps the soreness but not longstanding    Overall it's getting a bit better, but not resolved.       Baby's course has been uncomplicated outside of some initial breastfeeding struggles. Going much better now.     She is currently breastfeeding      Patient has not resumed intercourse.   Contraception methods:  Has had two Mirena IUD s in the past which helped lighten periods. Last placement though was needed under ultrasound guidance and it still took 45min. They tried first at 8 weeks postpartum and went back at 10 weeks for the US guidance. First IUD went well though.     Had a pap @ 8wk pp in 2022.     Bleeding pattern: normal for postpartum course; normal for postpartum course, no significant clots or cramping  Bowel function: normal, colace reviewed as needed  Bladder function: normal    Postpartum depression screening score: recently done at [place]'s 4wk [facility]       Other medical conditions that need follow up: hypothyroidism, needs labs also for lipids given strong family hx     Review of Systems:    12 point comprehensive review of systems was negative except as noted and HPI     Social History:  Social History     Social History Narrative    . Daughter Annalise -csection for SROM 26hr, Triple I, fetal decels after 3hr pushing. 8lb7oz despite Measuring small at 41 weeks.         From [facility]. She is a  with  [employer].  works with [employer]. Social alcohol 1x/every few weeks. Never smoker. Loves biking, golf, and aerobic dance videos, like Elsa. Exercises 3x/week.   Anya Palmer MD         Medical History:  The following portions of the patient's history were reviewed and updated as appropriate: allergies,  "current medications, and problem list    Medications:  Current Outpatient Medications   Medication Sig Dispense Refill    acetaminophen (TYLENOL) 325 MG tablet Take 3 tablets (975 mg) by mouth every 6 hours as needed for mild pain.      ibuprofen (ADVIL/MOTRIN) 200 MG tablet Take 4 tablets (800 mg) by mouth every 8 hours as needed for moderate pain.      levothyroxine (SYNTHROID/LEVOTHROID) 25 MCG tablet TAKE 1 TABLET BY MOUTH EVERY DAY 90 tablet 1    Prenatal Vit-Fe Fumarate-FA (PRENATAL MULTIVITAMIN  PLUS IRON) 27-1 MG TABS Take by mouth daily.      oxyCODONE (ROXICODONE) 5 MG tablet Take 1 tablet (5 mg) by mouth every 4 hours as needed for moderate to severe pain. (Patient not taking: Reported on 3/31/2025) 10 tablet 0    senna-docusate (SENOKOT-S/PERICOLACE) 8.6-50 MG tablet Take 1-2 tablets by mouth 2 times daily as needed for constipation. (Patient not taking: Reported on 3/31/2025)         Imaging & Labs reviewed this visit:   Lab Results   Component Value Date    WBC 7.4 02/17/2025    HGB 10.5 (L) 02/18/2025    HCT 33.0 (L) 02/17/2025    MCV 83 02/17/2025     02/17/2025     Lab Results   Component Value Date    TSH 2.14 11/27/2024     No results found for: \"HGBA1C\"    Objective:      Vitals:    03/31/25 1442   BP: 104/72   Pulse: 70   Resp: 16   SpO2: 100%   Weight: 61.3 kg (135 lb 1.6 oz)   Height: 1.651 m (5' 5\")       Physical Exam:     General: Alert, no acute distress.   HEET: normocephalic conjunctivae are clear, Nose is clear.  Oropharynx is moist and clear, without tonsillar hypertrophy, asymmetry, exudate or lesions.   Neck: supple without adenopathy or thyromegaly.  Back: Symmetric, no curvature, ROM normal, no CVA tenderness  Breasts: No breast masses, tenderness, asymmetry, or nipple discharge.  Lungs: Good aeration bilaterally. Clear to auscultation without wheezes, rales or rhonci.   Heart: regular rate and rhythm, normal S1 and S2, no murmurs  Abdomen: soft and nontender, no masses, no " organomegaly  Skin: clear without rash or lesions  Neuro: alert, interactive moving all extremities equally, normal muscle tone in all 4 extremities, deep tendon reflexes 2+ symmetrically at the patella  Pelvic: deferred to IUD placement  at future visit  Extremities: Extremities normal, atraumatic, no cyanosis or edema

## 2025-04-01 LAB
CHOLEST SERPL-MCNC: 240 MG/DL
FASTING STATUS PATIENT QL REPORTED: NO
HDLC SERPL-MCNC: 64 MG/DL
LDLC SERPL CALC-MCNC: 157 MG/DL
NONHDLC SERPL-MCNC: 176 MG/DL
TRIGL SERPL-MCNC: 97 MG/DL
TSH SERPL DL<=0.005 MIU/L-ACNC: 1.28 UIU/ML (ref 0.3–4.2)

## 2025-04-04 ENCOUNTER — HOSPITAL ENCOUNTER (OUTPATIENT)
Dept: ULTRASOUND IMAGING | Facility: CLINIC | Age: 30
Discharge: HOME | End: 2025-04-04
Attending: FAMILY MEDICINE | Admitting: FAMILY MEDICINE
Payer: COMMERCIAL

## 2025-04-04 DIAGNOSIS — R10.32 LLQ ABDOMINAL PAIN: ICD-10-CM

## 2025-04-04 PROCEDURE — 76856 US EXAM PELVIC COMPLETE: CPT

## 2025-04-21 ENCOUNTER — MEDICAL CORRESPONDENCE (OUTPATIENT)
Dept: HEALTH INFORMATION MANAGEMENT | Facility: CLINIC | Age: 30
End: 2025-04-21
Payer: COMMERCIAL

## 2025-05-19 ENCOUNTER — TRANSFERRED RECORDS (OUTPATIENT)
Dept: HEALTH INFORMATION MANAGEMENT | Facility: CLINIC | Age: 30
End: 2025-05-19
Payer: COMMERCIAL

## 2025-06-18 ENCOUNTER — MEDICAL CORRESPONDENCE (OUTPATIENT)
Dept: HEALTH INFORMATION MANAGEMENT | Facility: CLINIC | Age: 30
End: 2025-06-18
Payer: COMMERCIAL

## 2025-07-15 ENCOUNTER — MYC MEDICAL ADVICE (OUTPATIENT)
Dept: FAMILY MEDICINE | Facility: CLINIC | Age: 30
End: 2025-07-15
Payer: COMMERCIAL

## 2025-07-15 ENCOUNTER — E-VISIT (OUTPATIENT)
Dept: URGENT CARE | Facility: CLINIC | Age: 30
End: 2025-07-15
Payer: COMMERCIAL

## 2025-07-15 DIAGNOSIS — N61.0 MASTITIS: Primary | ICD-10-CM

## 2025-07-16 RX ORDER — DICLOXACILLIN SODIUM 500 MG/1
500 CAPSULE ORAL 4 TIMES DAILY
Qty: 40 CAPSULE | Refills: 0 | Status: SHIPPED | OUTPATIENT
Start: 2025-07-16 | End: 2025-07-26

## (undated) DEVICE — SU DERMABOND ADVANCED .7ML DNX12

## (undated) DEVICE — PACK MINOR SINGLE BASIN SSK3001

## (undated) DEVICE — GLOVE PI ULTRATCH M LF SZ 6.5 PF CUFF TEXT STRL LF 42665

## (undated) DEVICE — DRSG FOAM MEPILEX BORDER SILVER 4X10 POSTOP 498450

## (undated) DEVICE — GOWN IMPERVIOUS BREATHABLE 2XL/XLONG

## (undated) DEVICE — SU CHROMIC 0 CT 36" 914H

## (undated) DEVICE — SU VICRYL+ 3-0 CT1 36IN UND VCP944H

## (undated) DEVICE — SOL WATER IRRIG 1000ML BOTTLE 2F7114

## (undated) DEVICE — BLADE CLIPPER DISP 4406

## (undated) DEVICE — BASIN EMESIS STERILE  SSK9005A

## (undated) DEVICE — GLOVE SURG PI ULTRA TOUCH M SZ 8 LF

## (undated) DEVICE — SU MONOCRYL 0 CT-1 27" Y340H

## (undated) DEVICE — SUCTION MANIFOLD NEPTUNE 2 SYS 1 PORT 702-025-000

## (undated) DEVICE — ELECTRODE PATIENT RETURN ADULT L10 FT 2 PLATE CORD 0855C

## (undated) DEVICE — PAD INSERT MED PEACH 14X6.5 62550

## (undated) DEVICE — CUSTOM PACK C-SECTION LHE

## (undated) DEVICE — SOL NACL 0.9% IRRIG 1000ML BOTTLE 2F7124

## (undated) DEVICE — STPL SKIN SUBCUTICULAR INSORB  2030

## (undated) DEVICE — GLOVE SURG PI ULTRA TOUCH M SZ 7 LF 42670

## (undated) DEVICE — GOWN IMPERVIOUS BREATHABLE SMART LG 89015

## (undated) DEVICE — SUTURE PDS 0 60IN CTX+ LOOPED PDP990G

## (undated) DEVICE — PREP CHLORAPREP 26ML TINTED HI-LITE ORANGE 930815

## (undated) RX ORDER — FENTANYL CITRATE-0.9 % NACL/PF 10 MCG/ML
PLASTIC BAG, INJECTION (ML) INTRAVENOUS
Status: DISPENSED
Start: 2025-02-17

## (undated) RX ORDER — FENTANYL CITRATE 50 UG/ML
INJECTION, SOLUTION INTRAMUSCULAR; INTRAVENOUS
Status: DISPENSED
Start: 2025-02-17

## (undated) RX ORDER — ONDANSETRON 2 MG/ML
INJECTION INTRAMUSCULAR; INTRAVENOUS
Status: DISPENSED
Start: 2025-02-17

## (undated) RX ORDER — DEXAMETHASONE SODIUM PHOSPHATE 4 MG/ML
INJECTION, SOLUTION INTRA-ARTICULAR; INTRALESIONAL; INTRAMUSCULAR; INTRAVENOUS; SOFT TISSUE
Status: DISPENSED
Start: 2025-02-17

## (undated) RX ORDER — MORPHINE SULFATE 1 MG/ML
INJECTION, SOLUTION EPIDURAL; INTRATHECAL; INTRAVENOUS
Status: DISPENSED
Start: 2025-02-17